# Patient Record
Sex: MALE | Race: BLACK OR AFRICAN AMERICAN | Employment: FULL TIME | URBAN - METROPOLITAN AREA
[De-identification: names, ages, dates, MRNs, and addresses within clinical notes are randomized per-mention and may not be internally consistent; named-entity substitution may affect disease eponyms.]

---
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
## 2018-04-04 ENCOUNTER — HOSPITAL ENCOUNTER (OUTPATIENT)
Dept: SLEEP CENTER | Age: 18
Discharge: HOME | End: 2018-04-04
Payer: COMMERCIAL

## 2018-04-04 PROCEDURE — 95810 POLYSOM 6/> YRS 4/> PARAM: CPT

## 2019-03-16 PROCEDURE — 84145 PROCALCITONIN (PCT): CPT

## 2019-03-17 ENCOUNTER — HOSPITAL ENCOUNTER (OUTPATIENT)
Age: 19
Discharge: HOME | End: 2019-03-19

## 2019-03-17 LAB — PROCALCITONIN: 0.1 NG/ML (ref 0–0.08)

## 2019-03-27 LAB
CREATININE: 1 MG/DL
POTASSIUM (K+): 5.1

## 2020-02-26 ENCOUNTER — OFFICE VISIT (OUTPATIENT)
Dept: ORTHOPEDIC SURGERY | Age: 20
End: 2020-02-26
Payer: COMMERCIAL

## 2020-02-26 VITALS — BODY MASS INDEX: 45.1 KG/M2 | WEIGHT: 315 LBS | HEIGHT: 70 IN

## 2020-02-26 PROCEDURE — 99203 OFFICE O/P NEW LOW 30 MIN: CPT | Performed by: ORTHOPAEDIC SURGERY

## 2020-02-26 PROCEDURE — G8484 FLU IMMUNIZE NO ADMIN: HCPCS | Performed by: ORTHOPAEDIC SURGERY

## 2020-02-26 PROCEDURE — G8427 DOCREV CUR MEDS BY ELIG CLIN: HCPCS | Performed by: ORTHOPAEDIC SURGERY

## 2020-02-26 PROCEDURE — 4004F PT TOBACCO SCREEN RCVD TLK: CPT | Performed by: ORTHOPAEDIC SURGERY

## 2020-02-26 PROCEDURE — G8417 CALC BMI ABV UP PARAM F/U: HCPCS | Performed by: ORTHOPAEDIC SURGERY

## 2020-02-26 NOTE — PROGRESS NOTES
Stefani Sherman is a 22 y.o. male, who presents   Chief Complaint   Patient presents with    New Patient     new patient for right shoulder pain, patient states he injuried his collar bone in middle school. He states his pain is throbbing. HPI[de-identified] Shoulder pain is been present for some time. The patient locates this around the clavicular shaft area. He gives a history of having a fracture of the distal right clavicle when he was age 13 years. He was treated conservatively by other orthopedic surgeons in the area. He gives no other history of other residual problems in the area. He has good range of motion and no numbness or weakness. Allergies; medications; past medical, surgical, family, and social history; and problem list have been reviewed today and updated as indicated in this encounter - see below following Ortho specifics. Musculoskeletal: This is a large framed young man who is also moderately obese. Bony and soft tissue anatomy in the shoulder girdles is grossly symmetrical.  Elbow wrist and hand motion are good and he has good strength in the upper extremities in general.  The right shoulder area has a little tenderness around the distal clavicle shaft. There is no swelling and there are no masses. There is no alteration in temperature or color in the area. Both AC joint and glenohumeral joint are stable. He has full range of motion in the right shoulder with a little discomfort with full elevation. There is little if any crepitus present. Rotator cuff elements appear to be strong and other muscles about the shoulder girdle are intact and functioning well also. Radiologic Studies: Imaging of the right shoulder was done in AP and 10 degrees caudad tilt to evaluate the majority of the clavicle as well as the [facility] joint, glenohumeral joint and subacromial space all of which look normal.  There is no residual deformity from his previous fracture.     ASSESSMENT:  Renea Witt was seen today for new patient. Diagnoses and all orders for this visit:    Chronic right shoulder pain  -     XR SHOULDER RIGHT (MIN 2 VIEWS); Future     Treatment alternatives were reviewed including medical and physical therapies, injections, and surgical options, expected risks benefits and likely outcome of each were discussed in detail, questions asked and answered and understood. I discussed all these findings with Rc Muro who indicated good understanding. PLAN: No treatment is indicated at this time and there are no restrictions of activities indicated. Patient Active Problem List   Diagnosis    Morbid obesity ([facility] [address]       History reviewed. No pertinent past medical history. History reviewed. No pertinent surgical history. Current Outpatient Medications   Medication Sig Dispense Refill    metFORMIN (GLUCOPHAGE) 500 MG tablet Take 500 mg by mouth 2 times daily (with meals)      Oxymetazoline HCl (NASAL SPRAY NA) by Nasal route       No current facility-administered medications for this visit.         No Known Allergies    Social History     Socioeconomic History    Marital status: Single     Spouse name: None    Number of children: None    Years of education: None    Highest education level: None   Occupational History    None   Social Needs    Financial resource strain: None    Food insecurity:     Worry: None     Inability: None    Transportation needs:     Medical: None     Non-medical: None   Tobacco Use    Smoking status: Current Some Day Smoker     Types: E-Cigarettes    Smokeless tobacco: Never Used    Tobacco comment: socially   Substance and Sexual Activity    Alcohol use: No     Comment: socially    Drug use: Yes     Types: Marijuana     Comment: socially    Sexual activity: Never   Lifestyle    Physical activity:     Days per week: None     Minutes per session: None    Stress: None   Relationships    Social connections:     Talks on phone: None     Gets together: None Attends Faith service: None     Active member of club or organization: None     Attends meetings of clubs or organizations: None     Relationship status: None    Intimate partner violence:     Fear of current or ex partner: None     Emotionally abused: None     Physically abused: None     Forced sexual activity: None   Other Topics Concern    None   Social History Narrative    None       Family History   Problem Relation Age of Onset    Heart Attack Father     Heart Failure Father     Heart Failure Paternal Grandfather          Review of Systems:   As follows except as previously noted in HPI:  Constitutional: Negative for chills, diaphoresis,  fever   Respiratory: Negative for cough, shortness of breath and wheezing. Cardiovascular: Negative for chest pain and palpitations. Neurological: Negative for dizziness, syncope,   GI / : abdominal pain or cramping  Musculoskeletal: see HPI       Objective:   Physical Exam   Constitutional: Oriented to person, place, and time. and appears well-developed and well-nourished. :   Head: Normocephalic and atraumatic. Neck: Neck supple. Eyes: EOM are normal.   Pulmonary/Chest: Effort normal.  No respiratory distress, no wheezes. Neurological: Alert and oriented to person  Skin: Skin is warm and dry. Faby Gongora DO    2/26/20  4:04 PM    All reasonable efforts have been made to minimize the risk of errors that may occur in the use of voice recognition and other electronic means of charting.

## 2020-05-06 VITALS
HEART RATE: 84 BPM | DIASTOLIC BLOOD PRESSURE: 84 MMHG | RESPIRATION RATE: 14 BRPM | WEIGHT: 315 LBS | SYSTOLIC BLOOD PRESSURE: 130 MMHG | BODY MASS INDEX: 47.98 KG/M2

## 2020-09-02 VITALS
HEART RATE: 76 BPM | SYSTOLIC BLOOD PRESSURE: 130 MMHG | BODY MASS INDEX: 46.63 KG/M2 | DIASTOLIC BLOOD PRESSURE: 70 MMHG | TEMPERATURE: 96.6 F | RESPIRATION RATE: 14 BRPM | WEIGHT: 315 LBS

## 2021-05-03 ENCOUNTER — OFFICE VISIT (OUTPATIENT)
Dept: FAMILY MEDICINE CLINIC | Age: 21
End: 2021-05-03
Payer: COMMERCIAL

## 2021-05-03 VITALS
OXYGEN SATURATION: 97 % | BODY MASS INDEX: 46.65 KG/M2 | SYSTOLIC BLOOD PRESSURE: 114 MMHG | WEIGHT: 315 LBS | HEART RATE: 92 BPM | DIASTOLIC BLOOD PRESSURE: 80 MMHG | HEIGHT: 69 IN

## 2021-05-03 DIAGNOSIS — J45.909 MILD ASTHMA WITHOUT COMPLICATION, UNSPECIFIED WHETHER PERSISTENT: ICD-10-CM

## 2021-05-03 DIAGNOSIS — G47.33 OBSTRUCTIVE SLEEP APNEA SYNDROME: ICD-10-CM

## 2021-05-03 DIAGNOSIS — J30.89 ALLERGIC RHINITIS DUE TO OTHER ALLERGIC TRIGGER, UNSPECIFIED SEASONALITY: ICD-10-CM

## 2021-05-03 DIAGNOSIS — E66.01 CLASS 3 SEVERE OBESITY DUE TO EXCESS CALORIES WITH SERIOUS COMORBIDITY AND BODY MASS INDEX (BMI) OF 45.0 TO 49.9 IN ADULT (HCC): ICD-10-CM

## 2021-05-03 DIAGNOSIS — E78.5 HYPERLIPIDEMIA, UNSPECIFIED HYPERLIPIDEMIA TYPE: ICD-10-CM

## 2021-05-03 DIAGNOSIS — E11.9 TYPE 2 DIABETES MELLITUS WITHOUT COMPLICATION, WITHOUT LONG-TERM CURRENT USE OF INSULIN (HCC): Primary | ICD-10-CM

## 2021-05-03 PROCEDURE — 99214 OFFICE O/P EST MOD 30 MIN: CPT | Performed by: INTERNAL MEDICINE

## 2021-05-03 PROCEDURE — 2022F DILAT RTA XM EVC RTNOPTHY: CPT | Performed by: INTERNAL MEDICINE

## 2021-05-03 PROCEDURE — 3046F HEMOGLOBIN A1C LEVEL >9.0%: CPT | Performed by: INTERNAL MEDICINE

## 2021-05-03 PROCEDURE — G8417 CALC BMI ABV UP PARAM F/U: HCPCS | Performed by: INTERNAL MEDICINE

## 2021-05-03 PROCEDURE — 4004F PT TOBACCO SCREEN RCVD TLK: CPT | Performed by: INTERNAL MEDICINE

## 2021-05-03 PROCEDURE — G8427 DOCREV CUR MEDS BY ELIG CLIN: HCPCS | Performed by: INTERNAL MEDICINE

## 2021-05-03 RX ORDER — ERTUGLIFLOZIN 5 MG/1
1 TABLET, FILM COATED ORAL
COMMUNITY
End: 2021-05-03 | Stop reason: SDUPTHER

## 2021-05-03 RX ORDER — HUMAN INSULIN 100 [IU]/ML
INJECTION, SOLUTION SUBCUTANEOUS
COMMUNITY
Start: 2021-02-06 | End: 2021-05-03

## 2021-05-03 RX ORDER — ERTUGLIFLOZIN 5 MG/1
1 TABLET, FILM COATED ORAL DAILY
Qty: 30 TABLET | Refills: 3 | Status: SHIPPED
Start: 2021-05-03 | End: 2021-07-08

## 2021-05-03 RX ORDER — INSULIN DEGLUDEC INJECTION 100 U/ML
50 INJECTION, SOLUTION SUBCUTANEOUS 2 TIMES DAILY
Qty: 45 ML | Refills: 1 | Status: SHIPPED
Start: 2021-05-03 | End: 2021-07-08 | Stop reason: SDUPTHER

## 2021-05-03 SDOH — ECONOMIC STABILITY: FOOD INSECURITY: WITHIN THE PAST 12 MONTHS, THE FOOD YOU BOUGHT JUST DIDN'T LAST AND YOU DIDN'T HAVE MONEY TO GET MORE.: NEVER TRUE

## 2021-05-03 SDOH — ECONOMIC STABILITY: INCOME INSECURITY: HOW HARD IS IT FOR YOU TO PAY FOR THE VERY BASICS LIKE FOOD, HOUSING, MEDICAL CARE, AND HEATING?: NOT ASKED

## 2021-05-03 SDOH — ECONOMIC STABILITY: TRANSPORTATION INSECURITY
IN THE PAST 12 MONTHS, HAS THE LACK OF TRANSPORTATION KEPT YOU FROM MEDICAL APPOINTMENTS OR FROM GETTING MEDICATIONS?: NO

## 2021-05-03 ASSESSMENT — PATIENT HEALTH QUESTIONNAIRE - PHQ9: SUM OF ALL RESPONSES TO PHQ QUESTIONS 1-9: 0

## 2021-05-03 NOTE — PROGRESS NOTES
Minutes per session: Not on file    Stress: Not on file   Relationships    Social connections     Talks on phone: Not on file     Gets together: Not on file     Attends Mandaeism service: Not on file     Active member of club or organization: Not on file     Attends meetings of clubs or organizations: Not on file     Relationship status: Not on file    Intimate partner violence     Fear of current or ex partner: Not on file     Emotionally abused: Not on file     Physically abused: Not on file     Forced sexual activity: Not on file   Other Topics Concern    Not on file   Social History Narrative    Not on file        No past surgical history on file. Family History   Problem Relation Age of Onset    Asthma Mother     Diabetes Mother     High Blood Pressure Mother     High Cholesterol Mother     Heart Attack Father     Heart Failure Father     Coronary Art Dis Father     Other Father         renal failure- dialysis     High Blood Pressure Father     Diabetes Father     Blindness Father     Heart Failure Paternal Grandfather         No Known Allergies     ROS  No acute distress  Cardiac: Denies any chest pain or palpitation physically active denies any angina  Respiratory: Denies any cough or shortness of breath  Denies any chronic cough no dyspnea on exertion  GI: No abdominal pain. Denies any nausea vomiting or diarrhea  : Denies any dysuria frequency or hematuria  Neuro: No headache or dizziness  Endocrine: No diabetes  Skin: normal  Morbid obesity he has lost 25 pounds by dieting and exercising he gained about 10 pounds  Denies any change in vision    Objective:    /80   Pulse 92   Ht 5' 9\" (1.753 m)   Wt (!) 335 lb (152 kg)   SpO2 97%   BMI 49.47 kg/m²     Constitutional: Alert awake and oriented  Eyes: Pupils equal bilaterally. Extraocular muscles intact  Neck: no JVD adenopathy no bruit  Heart:  RRR, no murmurs, gallops, or rubs.   Lungs:    no wheeze, rales or rhonchi  Abd: bowel sounds present, nontender, nondistended, no masses  Extrem:  No clubbing, cyanosis, or edema  Neuro: AAOx3,No Focal deficit  Psychological: no depression or anxiety   Feet exam normal    Current Outpatient Medications   Medication Sig Dispense Refill    metFORMIN (GLUCOPHAGE) 500 MG tablet Take 1 tablet by mouth 2 times daily (with meals) 180 tablet 1    Insulin Degludec (TRESIBA) 100 UNIT/ML SOLN Inject 50 Units into the skin 2 times daily 42 units BID 45 mL 1    insulin regular (NOVOLIN R) 100 UNIT/ML injection Inject 20 Units into the skin 3 times daily (before meals) 5 vial 1    Ertugliflozin L-PyroglutamicAc (STEGLATRO) 5 MG TABS Take 1 tablet by mouth daily 30 tablet 3    Oxymetazoline HCl (NASAL SPRAY NA) by Nasal route       No current facility-administered medications for this visit.          Last 3 BMP  Lab Results   Component Value Date/Time     01/18/2012 08:08 AM    K 5.1 03/27/2019    K 4.8 01/18/2012 08:08 AM     01/18/2012 08:08 AM    CO2 32 (H) 01/18/2012 08:08 AM    BUN 15 01/18/2012 08:08 AM    CREATININE 1.0 03/27/2019    CREATININE 0.8 01/18/2012 08:08 AM    GLUCOSE 94 01/18/2012 08:08 AM    CALCIUM 10.2 01/18/2012 08:08 AM       Last 3 CMP:    Lab Results   Component Value Date/Time     01/18/2012 08:08 AM    K 5.1 03/27/2019    K 4.8 01/18/2012 08:08 AM     01/18/2012 08:08 AM    CO2 32 (H) 01/18/2012 08:08 AM    BUN 15 01/18/2012 08:08 AM    CREATININE 1.0 03/27/2019    CREATININE 0.8 01/18/2012 08:08 AM    GLUCOSE 94 01/18/2012 08:08 AM    CALCIUM 10.2 01/18/2012 08:08 AM    PROT 7.5 01/18/2012 08:08 AM    LABALBU 4.4 01/18/2012 08:08 AM    BILITOT 0.3 01/18/2012 08:08 AM    ALKPHOS 218 01/18/2012 08:08 AM    AST 21 01/18/2012 08:08 AM    ALT 20 01/18/2012 08:08 AM        CBC:   Lab Results   Component Value Date/Time    WBC 5.0 01/18/2012 08:08 AM    RBC 5.06 01/18/2012 08:08 AM    HGB 13.9 01/18/2012 08:08 AM    HCT 41.3 01/18/2012 08:08 AM    MCV 81.6 units twice daily  Patient has history of diabetic ketoacidosis in the past and renal failure which has improved      Check CBC, CMP, lipid, TSH, A1c, urine albumin      Check lipid profile will need cholesterol medication if liver enzymes are okay    Asthma is controlled      Allergic rhinitis saline nose spray and Claritin over-the-counter      Sleep apnea declined to go for CPAP titration he will exercise and lose weight    Severe obesity lifestyle diet modification discussed advised to continue diet and exercise and lose weight      Patient was counseled for long time been more compliant with the medication follow-up complications of diabetes discussed in detail    Return in about 2 months (around 7/3/2021).        Germán Shoemaker MD  3:53 PM  5/3/2021     [place]

## 2021-05-04 ENCOUNTER — HOSPITAL ENCOUNTER (EMERGENCY)
Age: 21
Discharge: HOME | End: 2021-05-04
Attending: EMERGENCY MEDICINE
Payer: COMMERCIAL

## 2021-05-04 VITALS
TEMPERATURE: 97.5 F | WEIGHT: 315 LBS | OXYGEN SATURATION: 97 % | DIASTOLIC BLOOD PRESSURE: 91 MMHG | SYSTOLIC BLOOD PRESSURE: 142 MMHG | RESPIRATION RATE: 16 BRPM | HEART RATE: 80 BPM | HEIGHT: 71 IN | BODY MASS INDEX: 44.1 KG/M2

## 2021-05-04 DIAGNOSIS — S09.90XA INJURY OF HEAD, INITIAL ENCOUNTER: Primary | ICD-10-CM

## 2021-05-04 PROCEDURE — 99282 EMERGENCY DEPT VISIT SF MDM: CPT

## 2021-05-04 ASSESSMENT — ENCOUNTER SYMPTOMS
EYE DISCHARGE: 0
COUGH: 0
SINUS PRESSURE: 0
DIARRHEA: 0
WHEEZING: 0
BLURRED VISION: 0
NAUSEA: 0
DIFFICULTY BREATHING: 0
SORE THROAT: 0
DOUBLE VISION: 0
ABDOMINAL PAIN: 0
VOMITING: 0
SHORTNESS OF BREATH: 0
EYE PAIN: 0
EYE REDNESS: 0
BACK PAIN: 0

## 2021-05-04 ASSESSMENT — PAIN SCALES - GENERAL: PAINLEVEL_OUTOF10: 8

## 2021-05-04 ASSESSMENT — PAIN DESCRIPTION - PAIN TYPE: TYPE: ACUTE PAIN

## 2021-05-04 ASSESSMENT — PAIN DESCRIPTION - LOCATION: LOCATION: HEAD

## 2021-05-04 ASSESSMENT — PAIN DESCRIPTION - FREQUENCY: FREQUENCY: CONTINUOUS

## 2021-05-04 NOTE — ED PROVIDER NOTES
The history is provided by the patient. Head Injury  Location:  Frontal  Time since incident:  1 hour  Mechanism of injury: fall    Fall:     Fall occurred:  Tripped and walking    Impact surface: cabinet. Associated symptoms: headache    Associated symptoms: no blurred vision, no difficulty breathing, no disorientation, no double vision, no loss of consciousness, no nausea, no numbness, no seizures and no vomiting         Review of Systems   Constitutional: Negative for chills and fever. HENT: Negative for ear pain, sinus pressure and sore throat. Eyes: Negative for blurred vision, double vision, pain, discharge and redness. Respiratory: Negative for cough, shortness of breath and wheezing. Cardiovascular: Negative for chest pain. Gastrointestinal: Negative for abdominal pain, diarrhea, nausea and vomiting. Genitourinary: Negative for dysuria and frequency. Musculoskeletal: Negative for arthralgias and back pain. Skin: Negative for rash and wound. Neurological: Positive for headaches. Negative for seizures, loss of consciousness, weakness and numbness. Hematological: Negative for adenopathy. All other systems reviewed and are negative. Physical Exam  Vitals signs and nursing note reviewed. Constitutional:       Appearance: He is well-developed. HENT:      Head: Normocephalic and atraumatic. Jaw: No trismus. Right Ear: Hearing, tympanic membrane and external ear normal.      Left Ear: Hearing, tympanic membrane and external ear normal.      Nose: Nose normal.      Right Sinus: No maxillary sinus tenderness or frontal sinus tenderness. Left Sinus: No maxillary sinus tenderness or frontal sinus tenderness. Mouth/Throat:      Pharynx: Uvula midline. No uvula swelling. Eyes:      General: Lids are normal.      Conjunctiva/sclera: Conjunctivae normal.      Pupils: Pupils are equal, round, and reactive to light.    Neck:      Musculoskeletal: Normal range of motion and neck supple. Cardiovascular:      Rate and Rhythm: Normal rate and regular rhythm. Heart sounds: Normal heart sounds. No murmur. Pulmonary:      Effort: Pulmonary effort is normal. No respiratory distress. Breath sounds: Normal breath sounds. No wheezing or rales. Abdominal:      General: Bowel sounds are normal.      Palpations: Abdomen is soft. Abdomen is not rigid. Tenderness: There is no abdominal tenderness. There is no guarding or rebound. Skin:     General: Skin is warm and dry. Findings: No abrasion or rash. Neurological:      Mental Status: He is alert and oriented to person, place, and time. GCS: GCS eye subscore is 4. GCS verbal subscore is 5. GCS motor subscore is 6. Cranial Nerves: No cranial nerve deficit. Sensory: No sensory deficit. Coordination: Coordination normal.      Gait: Gait normal.          Procedures     MDM          --------------------------------------------- PAST HISTORY ---------------------------------------------  Past Medical History:  has a past medical history of Asthma, [facility] Hyperlipidemia, Hypertension, Obesity, Sinusitis, and Type 2 diabetes mellitus without complication ([facility] Past Surgical History:  has no past surgical history on file. Social History:  reports that he has been smoking e-cigarettes. He has never used smokeless tobacco. He reports current alcohol use. He reports current drug use. Drug: Marijuana. Family History: family history includes Asthma in his mother; Blindness in his father; Coronary Art Dis in his father; Diabetes in his father and mother; Heart Attack in his father; Heart Failure in his father and paternal grandfather; High Blood Pressure in his father and mother; High Cholesterol in his mother; Other in his father. The patients home medications have been reviewed.     Allergies: Patient has no known allergies. -------------------------------------------------- RESULTS -------------------------------------------------  Labs:  No results found for this visit on 05/04/21. Radiology:  No orders to display       ------------------------- NURSING NOTES AND VITALS REVIEWED ---------------------------  Date / Time Roomed:  5/4/2021  6:25 PM  ED Bed Assignment:  02/02    The nursing notes within the ED encounter and vital signs as below have been reviewed. BP (!) 142/91   Pulse 80   Temp 97.5 °F (36.4 °C) (Temporal)   Resp 16   Ht 5' 11\" (1.803 m)   Wt (!) 335 lb (152 kg)   SpO2 97%   BMI 46.72 kg/m²   Oxygen Saturation Interpretation: Normal      ------------------------------------------ PROGRESS NOTES ------------------------------------------  I have spoken with the patient and discussed todays results, in addition to providing specific details for the plan of care and counseling regarding the diagnosis and prognosis. Their questions are answered at this time and they are agreeable with the plan. I discussed at length with them reasons for immediate return here for re evaluation. They will followup with primary care by calling their office tomorrow. --------------------------------- ADDITIONAL PROVIDER NOTES ---------------------------------  At this time the patient is without objective evidence of an acute process requiring hospitalization or inpatient management. They have remained hemodynamically stable throughout their entire ED visit and are stable for discharge with outpatient follow-up. The plan has been discussed in detail and they are aware of the specific conditions for emergent return, as well as the importance of follow-up. Discharge Medication List as of 5/4/2021  6:44 PM          Diagnosis:  1. Injury of head, initial encounter        Disposition:  Patient's disposition: Discharge to home  Patient's condition is stable.                       Mo Zimmerman, MD  05/04/21 [id number]

## 2021-07-08 ENCOUNTER — OFFICE VISIT (OUTPATIENT)
Dept: FAMILY MEDICINE CLINIC | Age: 21
End: 2021-07-08
Payer: COMMERCIAL

## 2021-07-08 VITALS
BODY MASS INDEX: 44.77 KG/M2 | TEMPERATURE: 97.6 F | HEART RATE: 59 BPM | OXYGEN SATURATION: 98 % | DIASTOLIC BLOOD PRESSURE: 84 MMHG | SYSTOLIC BLOOD PRESSURE: 128 MMHG | WEIGHT: 315 LBS

## 2021-07-08 DIAGNOSIS — E66.01 CLASS 3 SEVERE OBESITY DUE TO EXCESS CALORIES WITHOUT SERIOUS COMORBIDITY WITH BODY MASS INDEX (BMI) OF 40.0 TO 44.9 IN ADULT (HCC): ICD-10-CM

## 2021-07-08 DIAGNOSIS — J01.90 ACUTE SINUSITIS, RECURRENCE NOT SPECIFIED, UNSPECIFIED LOCATION: Primary | ICD-10-CM

## 2021-07-08 DIAGNOSIS — J30.89 ALLERGIC RHINITIS DUE TO OTHER ALLERGIC TRIGGER, UNSPECIFIED SEASONALITY: ICD-10-CM

## 2021-07-08 DIAGNOSIS — E78.5 HYPERLIPIDEMIA, UNSPECIFIED HYPERLIPIDEMIA TYPE: ICD-10-CM

## 2021-07-08 DIAGNOSIS — J45.990 EXERCISE-INDUCED ASTHMA: ICD-10-CM

## 2021-07-08 DIAGNOSIS — G47.33 OBSTRUCTIVE SLEEP APNEA SYNDROME: ICD-10-CM

## 2021-07-08 DIAGNOSIS — E11.9 TYPE 2 DIABETES MELLITUS WITHOUT COMPLICATION, WITHOUT LONG-TERM CURRENT USE OF INSULIN (HCC): ICD-10-CM

## 2021-07-08 PROBLEM — J30.9 ALLERGIC RHINITIS: Status: ACTIVE | Noted: 2021-07-08

## 2021-07-08 PROBLEM — E66.813 CLASS 3 SEVERE OBESITY DUE TO EXCESS CALORIES WITHOUT SERIOUS COMORBIDITY WITH BODY MASS INDEX (BMI) OF 40.0 TO 44.9 IN ADULT: Status: ACTIVE | Noted: 2021-07-08

## 2021-07-08 PROCEDURE — G8417 CALC BMI ABV UP PARAM F/U: HCPCS | Performed by: INTERNAL MEDICINE

## 2021-07-08 PROCEDURE — 3046F HEMOGLOBIN A1C LEVEL >9.0%: CPT | Performed by: INTERNAL MEDICINE

## 2021-07-08 PROCEDURE — 2022F DILAT RTA XM EVC RTNOPTHY: CPT | Performed by: INTERNAL MEDICINE

## 2021-07-08 PROCEDURE — G8427 DOCREV CUR MEDS BY ELIG CLIN: HCPCS | Performed by: INTERNAL MEDICINE

## 2021-07-08 PROCEDURE — 99213 OFFICE O/P EST LOW 20 MIN: CPT | Performed by: INTERNAL MEDICINE

## 2021-07-08 PROCEDURE — 4004F PT TOBACCO SCREEN RCVD TLK: CPT | Performed by: INTERNAL MEDICINE

## 2021-07-08 RX ORDER — AZITHROMYCIN 250 MG/1
250 TABLET, FILM COATED ORAL SEE ADMIN INSTRUCTIONS
Qty: 6 TABLET | Refills: 0 | Status: SHIPPED | OUTPATIENT
Start: 2021-07-08 | End: 2021-07-13

## 2021-07-08 RX ORDER — DEXTROMETHORPHAN HYDROBROMIDE AND PROMETHAZINE HYDROCHLORIDE 15; 6.25 MG/5ML; MG/5ML
5 SYRUP ORAL 4 TIMES DAILY PRN
Qty: 150 ML | Refills: 0 | Status: SHIPPED | OUTPATIENT
Start: 2021-07-08 | End: 2021-07-18

## 2021-07-08 RX ORDER — INSULIN DEGLUDEC INJECTION 100 U/ML
50 INJECTION, SOLUTION SUBCUTANEOUS 2 TIMES DAILY
Qty: 45 ML | Refills: 1 | Status: SHIPPED
Start: 2021-07-08 | End: 2022-01-04 | Stop reason: SDUPTHER

## 2021-07-08 NOTE — PROGRESS NOTES
Subjective:     Chief Complaint   Patient presents with    3 Month Follow-Up    Eczema     hands    Chest Congestion   Patient complains of runny nose stuffy nose drainage  Has history of asthma  Occasional cough    No nausea vomiting diarrhea    History of exercise-induced asthma  Doing better now    Follow-up on the diabetes    He did not do the blood work he lost his prescription    He has not been able to contact the endocrinologist    Ovalleens Quintana is not covered by the insurance company        Past Medical History:   Diagnosis Date    Asthma     DKA (diabetic k[facility]acidoses) ([facility] 2019    Hyperlipidemia     Hypertension     Obesity     Sinusitis     Type 2 diabetes mellitus without complication ([facility]         Social History     Socioeconomic History    Marital status: Single     Spouse name: Not on file    Number of children: Not on file    Years of education: Not on file    Highest education level: Not on file   Occupational History    Not on file   Tobacco Use    Smoking status: Current Some Day Smoker     Types: E-Cigarettes    Smokeless tobacco: Never Used    Tobacco comment: socially   Vaping Use    Vaping Use: Never used   Substance and Sexual Activity    Alcohol use: Yes     Comment: socially    Drug use: Yes     Types: Marijuana     Comment: socially    Sexual activity: Never   Other Topics Concern    Not on file   Social History Narrative    Not on file     Social Determinants of Health     Financial Resource Strain:     Difficulty of Paying Living Expenses:    Food Insecurity: No Food Insecurity    Worried About Running Out of Food in the Last Year: Never true    Nabor of Food in the Last Year: Never true   Transportation Needs: No Transportation Needs    Lack of Transportation (Medical): No    Lack of Transportation (Non-Medical):  No   Physical Activity:     Days of Exercise per Week:     Minutes of Exercise per Session:    Stress:     Feeling of Stress :    Social deficit  Psychological: no depression or anxiety       Current Outpatient Medications   Medication Sig Dispense Refill    metFORMIN (GLUCOPHAGE) 500 MG tablet Take 1 tablet by mouth 2 times daily (with meals) 180 tablet 1    insulin regular (NOVOLIN R) 100 UNIT/ML injection Inject 20 Units into the skin 3 times daily (before meals) 5 vial 1    Insulin Degludec (TRESIBA) 100 UNIT/ML SOLN Inject 50 Units into the skin 2 times daily 42 units BID 45 mL 1    Semaglutide (OZEMPIC, 0.25 OR 0.5 MG/DOSE, SC) Inject 0.5 mg into the skin      dapagliflozin (FARXIGA) 5 MG tablet Take 1 tablet by mouth every morning 30 tablet 3    Naloxegol Oxalate (MOVANTIK PO) Take by mouth once a week       No current facility-administered medications for this visit.         Last 3 BMP  Lab Results   Component Value Date/Time     01/18/2012 08:08 AM    K 5.1 03/27/2019 12:00 AM    K 4.8 01/18/2012 08:08 AM     01/18/2012 08:08 AM    CO2 32 (H) 01/18/2012 08:08 AM    BUN 15 01/18/2012 08:08 AM    CREATININE 1.0 03/27/2019 12:00 AM    CREATININE 0.8 01/18/2012 08:08 AM    GLUCOSE 94 01/18/2012 08:08 AM    CALCIUM 10.2 01/18/2012 08:08 AM       Last 3 CMP:    Lab Results   Component Value Date/Time     01/18/2012 08:08 AM    K 5.1 03/27/2019 12:00 AM    K 4.8 01/18/2012 08:08 AM     01/18/2012 08:08 AM    CO2 32 (H) 01/18/2012 08:08 AM    BUN 15 01/18/2012 08:08 AM    CREATININE 1.0 03/27/2019 12:00 AM    CREATININE 0.8 01/18/2012 08:08 AM    GLUCOSE 94 01/18/2012 08:08 AM    CALCIUM 10.2 01/18/2012 08:08 AM    PROT 7.5 01/18/2012 08:08 AM    LABALBU 4.4 01/18/2012 08:08 AM    BILITOT 0.3 01/18/2012 08:08 AM    ALKPHOS 218 01/18/2012 08:08 AM    AST 21 01/18/2012 08:08 AM    ALT 20 01/18/2012 08:08 AM        CBC:   Lab Results   Component Value Date/Time    WBC 5.0 01/18/2012 08:08 AM    RBC 5.06 01/18/2012 08:08 AM    HGB 13.9 01/18/2012 08:08 AM    HCT 41.3 01/18/2012 08:08 AM    MCV 81.6 01/18/2012 08:08 AM    MCH 27.4 01/18/2012 08:08 AM    MCHC 33.6 01/18/2012 08:08 AM    RDW 15.0 01/18/2012 08:08 AM     01/18/2012 08:08 AM    MPV 9.1 01/18/2012 08:08 AM       A1C:  No results found for: LABA1C    Lipid panel:  No results found for: CHOL, TRIG, HDL     Lab Results   Component Value Date/Time    PROT 7.5 01/18/2012 08:08 AM       No results found for: MG      Assessment. Oli Moreno was seen today for 3 month follow-up, eczema and chest congestion. Diagnoses and all orders for this visit:    Acute sinusitis, recurrence not specified, unspecified location    Type 2 diabetes mellitus without complication, without long-term current use of insulin ([facility])  -     TSH; Future  -     URINALYSIS; Future  -     MICROALBUMIN, UR; Future  -     Kristina Baez MD, Endocrinology, [place]    Hyperlipidemia, unspecified hyperlipidemia type  -     LIPID PANEL; Future  -     TSH; Future    Class 3 severe obesity due to excess calories without serious comorbidity with body mass index (BMI) of 40.0 to 44.9 in adult ([facility])  -     CBC WITH AUTO DIFFERENTIAL; Future  -     COMPREHENSIVE METABOLIC PANEL; Future  -     HEMOGLOBIN A1C; Future    Obstructive sleep apnea syndrome    Allergic rhinitis due to other allergic trigger, unspecified seasonality    Exercise-induced asthma    Other orders  -     metFORMIN (GLUCOPHAGE) 500 MG tablet; Take 1 tablet by mouth 2 times daily (with meals)  -     insulin regular (NOVOLIN R) 100 UNIT/ML injection; Inject 20 Units into the skin 3 times daily (before meals)  -     Insulin Degludec (TRESIBA) 100 UNIT/ML SOLN; Inject 50 Units into the skin 2 times daily 42 units BID  -     dapagliflozin (FARXIGA) 5 MG tablet;  Take 1 tablet by mouth every morning       Patient Active Problem List   Diagnosis    Morbid obesity ([address]    Class 3 severe obesity due to excess calories without serious comorbidity with body mass index (BMI) of 40.0 to 44.9 in adult [facility])    Hyperlipidemia    Type 2 diabetes mellitus without complication, without long-term current use of insulin ([facility]    Obstructive sleep apnea syndrome    Allergic rhinitis       Plan: Sinusitis Z-Rohan and Phenergan DM syrup    Diabetes type 2 he is not checking his sugar did not do the A1c another prescription given  Steglatro not covered  Try Farxiga  Stay on Ozempic, Metformin, Tresiba, regular insulin,  Continue watch the diet    Check lipid profile    Sleep apnea improving since he lost weight    He is still working on the diet and exercise and losing weight has more energy and feeling better    Referred to [address] [place] [address] endocrinology    Return in about 3 months (around 10/8/2021).        Kylie Mishra MD  2:55 PM  7/8/2021     [place]

## 2021-11-14 ENCOUNTER — HOSPITAL ENCOUNTER (EMERGENCY)
Age: 21
Discharge: HOME | End: 2021-11-14
Attending: EMERGENCY MEDICINE
Payer: COMMERCIAL

## 2021-11-14 VITALS
DIASTOLIC BLOOD PRESSURE: 84 MMHG | HEART RATE: 68 BPM | RESPIRATION RATE: 18 BRPM | BODY MASS INDEX: 43.96 KG/M2 | OXYGEN SATURATION: 99 % | WEIGHT: 315 LBS | SYSTOLIC BLOOD PRESSURE: 129 MMHG | TEMPERATURE: 97.1 F

## 2021-11-14 DIAGNOSIS — R11.2 NON-INTRACTABLE VOMITING WITH NAUSEA, UNSPECIFIED VOMITING TYPE: Primary | ICD-10-CM

## 2021-11-14 LAB — SARS-COV-2, NAAT: NOT DETECTED

## 2021-11-14 PROCEDURE — 87635 SARS-COV-2 COVID-19 AMP PRB: CPT

## 2021-11-14 PROCEDURE — 99283 EMERGENCY DEPT VISIT LOW MDM: CPT

## 2021-11-14 PROCEDURE — 6370000000 HC RX 637 (ALT 250 FOR IP): Performed by: EMERGENCY MEDICINE

## 2021-11-14 RX ORDER — ONDANSETRON 4 MG/1
4 TABLET, ORALLY DISINTEGRATING ORAL EVERY 8 HOURS PRN
Qty: 6 TABLET | Refills: 0 | Status: SHIPPED | OUTPATIENT
Start: 2021-11-14 | End: 2022-01-04

## 2021-11-14 RX ORDER — ONDANSETRON 4 MG/1
4 TABLET, ORALLY DISINTEGRATING ORAL ONCE
Status: COMPLETED | OUTPATIENT
Start: 2021-11-14 | End: 2021-11-14

## 2021-11-14 RX ADMIN — ONDANSETRON 4 MG: 4 TABLET, ORALLY DISINTEGRATING ORAL at 14:14

## 2021-11-14 ASSESSMENT — ENCOUNTER SYMPTOMS
NAUSEA: 1
EYE REDNESS: 0
VOMITING: 1
ABDOMINAL PAIN: 1
COUGH: 0
SORE THROAT: 0
EYE DISCHARGE: 0
EYE PAIN: 0
WHEEZING: 0
DIARRHEA: 0
SINUS PRESSURE: 0
BACK PAIN: 0
SHORTNESS OF BREATH: 0

## 2021-11-14 ASSESSMENT — PAIN DESCRIPTION - LOCATION: LOCATION: ABDOMEN

## 2021-11-14 ASSESSMENT — PAIN SCALES - GENERAL: PAINLEVEL_OUTOF10: 4

## 2021-11-14 ASSESSMENT — PAIN DESCRIPTION - DESCRIPTORS: DESCRIPTORS: CRAMPING

## 2021-11-14 ASSESSMENT — PAIN DESCRIPTION - ORIENTATION: ORIENTATION: LOWER

## 2021-11-14 ASSESSMENT — PAIN DESCRIPTION - FREQUENCY: FREQUENCY: INTERMITTENT

## 2021-11-14 NOTE — Clinical Note
Rose Salazar was seen and treated in our emergency department on 11/14/2021. He may return to work on 11/16/2021. If you have any questions or concerns, please don't hesitate to call.       Catrachito Ruiz, DO

## 2021-11-14 NOTE — ED PROVIDER NOTES
The history is provided by the patient. Nausea & Vomiting  Severity:  Mild  Timing:  Intermittent  Number of daily episodes:  2  Quality:  Stomach contents  Progression:  Partially resolved  Chronicity:  New  Recent urination:  Normal  Relieved by:  Nothing  Worsened by:  Nothing  Ineffective treatments:  None tried  Associated symptoms: abdominal pain    Associated symptoms: no arthralgias, no chills, no cough, no diarrhea, no fever, no headaches and no sore throat         Review of Systems   Constitutional: Negative for chills and fever. HENT: Negative for ear pain, sinus pressure and sore throat. Eyes: Negative for pain, discharge and redness. Respiratory: Negative for cough, shortness of breath and wheezing. Cardiovascular: Negative for chest pain. Gastrointestinal: Positive for abdominal pain, nausea and vomiting. Negative for diarrhea. Genitourinary: Negative for dysuria and frequency. Musculoskeletal: Negative for arthralgias and back pain. Skin: Negative for rash and wound. Neurological: Negative for weakness and headaches. Hematological: Negative for adenopathy. Psychiatric/Behavioral: Negative. All other systems reviewed and are negative. Physical Exam  Vitals and nursing note reviewed. Constitutional:       Appearance: He is well-developed. HENT:      Head: Normocephalic and atraumatic. Eyes:      Pupils: Pupils are equal, round, and reactive to light. Cardiovascular:      Rate and Rhythm: Normal rate and regular rhythm. Heart sounds: Normal heart sounds. No murmur heard. Pulmonary:      Effort: Pulmonary effort is normal.      Breath sounds: Normal breath sounds. Abdominal:      General: Bowel sounds are increased. Palpations: Abdomen is soft. Tenderness: There is abdominal tenderness in the epigastric area. There is no guarding or rebound. Musculoskeletal:      Cervical back: Normal range of motion and neck supple.    Skin: General: Skin is warm and dry. Neurological:      Mental Status: He is alert and oriented to person, place, and time. Psychiatric:         Behavior: Behavior normal.         Thought Content: Thought content normal.         Judgment: Judgment normal.        --------------------------------------------- PAST HISTORY ---------------------------------------------  Past Medical History:  has a past medical history of Asthma, DKA (diabetic ketoacidoses), Hyperlipidemia, Hypertension, Obesity, Sinusitis, and Type 2 diabetes mellitus without complication ([facility] Past Surgical History:  has no past surgical history on file. Social History:  reports that he has been smoking e-cigarettes. He has never used smokeless tobacco. He reports current alcohol use. He reports current drug use. Drug: Marijuana Sanchez Gao). Family History: family history includes Asthma in his mother; Blindness in his father; Coronary Art Dis in his father; Diabetes in his father and mother; Heart Attack in his father; Heart Failure in his father and paternal grandfather; High Blood Pressure in his father and mother; High Cholesterol in his mother; Other in his father. The patients home medications have been reviewed. Allergies: Patient has no known allergies. -------------------------------------------------- RESULTS -------------------------------------------------  Results for orders placed or performed during the hospital encounter of 11/14/21   COVID-19, Rapid    Specimen: Nares   Result Value Ref Range    SARS-CoV-2, NAAT Not Detected Not Detected     No orders to display       ------------------------- NURSING NOTES AND VITALS REVIEWED ---------------------------   The nursing notes within the ED encounter and vital signs as below have been reviewed.    /84   Pulse 68   Temp 97.1 °F (36.2 °C)   Resp 18   Wt (!) 315 lb 3.2 oz (143 kg)   SpO2 99%   BMI 43.96 kg/m²   Oxygen Saturation Interpretation: Normal      ------------------------------------------ PROGRESS NOTES ------------------------------------------   I have spoken with the patient and discussed todays results, in addition to providing specific details for the plan of care and counseling regarding the diagnosis and prognosis. Their questions are answered at this time and they are agreeable with the plan.      --------------------------------- ADDITIONAL PROVIDER NOTES ---------------------------------        This patient is stable for discharge. I have shared the specific conditions for return, as well as the importance of follow-up.       IMPRESSION:     1. Non-intractable vomiting with nausea, unspecified vomiting type      Patient's Medications   New Prescriptions    ONDANSETRON (ZOFRAN ODT) 4 MG DISINTEGRATING TABLET    Take 1 tablet by mouth every 8 hours as needed for Nausea or Vomiting   Previous Medications    DAPAGLIFLOZIN (FARXIGA) 5 MG TABLET    Take 1 tablet by mouth every morning    INSULIN DEGLUDEC (TRESIBA) 100 UNIT/ML SOLN    Inject 50 Units into the skin 2 times daily 42 units BID    INSULIN REGULAR (NOVOLIN R) 100 UNIT/ML INJECTION    Inject 20 Units into the skin 3 times daily (before meals)    METFORMIN (GLUCOPHAGE) 500 MG TABLET    Take 1 tablet by mouth 2 times daily (with meals)    NALOXEGOL OXALATE (MOVANTIK PO)    Take by mouth once a week   Modified Medications    No medications on file   Discontinued Medications    SEMAGLUTIDE (OZEMPIC, 0.25 OR 0.5 MG/DOSE, SC)    Inject 0.5 mg into the skin         Zoran Carlisle DO  11/14/21 0280

## 2022-01-04 ENCOUNTER — OFFICE VISIT (OUTPATIENT)
Dept: FAMILY MEDICINE CLINIC | Age: 22
End: 2022-01-04
Payer: COMMERCIAL

## 2022-01-04 VITALS
WEIGHT: 294 LBS | OXYGEN SATURATION: 96 % | DIASTOLIC BLOOD PRESSURE: 86 MMHG | TEMPERATURE: 97.3 F | BODY MASS INDEX: 41 KG/M2 | SYSTOLIC BLOOD PRESSURE: 130 MMHG | HEART RATE: 80 BPM

## 2022-01-04 DIAGNOSIS — E66.01 MORBID OBESITY (HCC): ICD-10-CM

## 2022-01-04 DIAGNOSIS — R63.4 WEIGHT LOSS: ICD-10-CM

## 2022-01-04 DIAGNOSIS — E11.9 TYPE 2 DIABETES MELLITUS WITHOUT COMPLICATION, WITHOUT LONG-TERM CURRENT USE OF INSULIN (HCC): Primary | ICD-10-CM

## 2022-01-04 DIAGNOSIS — E78.5 HYPERLIPIDEMIA, UNSPECIFIED HYPERLIPIDEMIA TYPE: ICD-10-CM

## 2022-01-04 DIAGNOSIS — Z23 NEED FOR IMMUNIZATION AGAINST INFLUENZA: ICD-10-CM

## 2022-01-04 DIAGNOSIS — J45.990 EXERCISE-INDUCED ASTHMA: ICD-10-CM

## 2022-01-04 PROCEDURE — 99214 OFFICE O/P EST MOD 30 MIN: CPT | Performed by: INTERNAL MEDICINE

## 2022-01-04 PROCEDURE — 90674 CCIIV4 VAC NO PRSV 0.5 ML IM: CPT | Performed by: INTERNAL MEDICINE

## 2022-01-04 PROCEDURE — 2022F DILAT RTA XM EVC RTNOPTHY: CPT | Performed by: INTERNAL MEDICINE

## 2022-01-04 PROCEDURE — 90471 IMMUNIZATION ADMIN: CPT | Performed by: INTERNAL MEDICINE

## 2022-01-04 PROCEDURE — 4004F PT TOBACCO SCREEN RCVD TLK: CPT | Performed by: INTERNAL MEDICINE

## 2022-01-04 PROCEDURE — G8484 FLU IMMUNIZE NO ADMIN: HCPCS | Performed by: INTERNAL MEDICINE

## 2022-01-04 PROCEDURE — 3046F HEMOGLOBIN A1C LEVEL >9.0%: CPT | Performed by: INTERNAL MEDICINE

## 2022-01-04 PROCEDURE — G8417 CALC BMI ABV UP PARAM F/U: HCPCS | Performed by: INTERNAL MEDICINE

## 2022-01-04 PROCEDURE — G8427 DOCREV CUR MEDS BY ELIG CLIN: HCPCS | Performed by: INTERNAL MEDICINE

## 2022-01-04 RX ORDER — INSULIN DEGLUDEC INJECTION 100 U/ML
50 INJECTION, SOLUTION SUBCUTANEOUS 2 TIMES DAILY
Qty: 45 ML | Refills: 1 | Status: SHIPPED | OUTPATIENT
Start: 2022-01-04 | End: 2022-03-15 | Stop reason: SDUPTHER

## 2022-01-04 NOTE — PROGRESS NOTES
Subjective:     Chief Complaint   Patient presents with    Diabetes   Patient here for follow-up on diabetes,  She has not done the blood work, he was given prescription twice in the past he has not done the blood work,    He is trying to watch diet and lose weight,  He used to weigh 325 pounds now he weighs 294 pounds    He is not drinking any pop, not eating any sweets, does not drink any juice,  He walks a lot, he was seen by endocrinologist in the past, he wanted to see a different physician, he has been referred to endocrinology he has an appointment on 14 January 2022 advised to keep that appointment    He was given [place] last time he did not use that he did not get that  He never called me back    He has history of asthma doing very well    He is not snoring as much since he lost weight    He used to smoke in the past 1 pack a day he smoked for 10 years quit smoking now    Past Medical History:   Diagnosis Date    Asthma     DKA (diabetic ketoacidoses) 2019    Hyperlipidemia     Hypertension     Obesity     Sinusitis     Type 2 [facility] mellitus without complication [facility]         Social History     Socioeconomic History    Marital status: Single     Spouse name: Not on file    Number of children: Not on file    Years of education: Not on file    Highest education level: Not on file   Occupational History    Not on file   Tobacco Use    Smoking status: Current Some Day Smoker     Types: E-Cigarettes    Smokeless tobacco: Never Used    Tobacco comment: socially   Vaping Use    Vaping Use: Never used   Substance and Sexual Activity    Alcohol use: Yes     Comment: socially    Drug use: Yes     Types: Marijuana Janel Mejia     Comment: socially    Sexual activity: Never   Other Topics Concern    Not on file   Social History Narrative    Not on file     Social Determinants of Health     Financial Resource Strain:     Difficulty of Paying Living Expenses: Not on file   Food Insecurity: No Food Insecurity    Worried About Running Out of Food in the Last Year: Never true    N[place] of Food in the Last Year: Never true   Transportation Needs: No Transportation Needs    Lack of Transportation (Medical): No    Lack of Transportation (Non-Medical): No   Physical Activity:     Days of Exercise per Week: Not on file    Minutes of Exercise per Session: Not on file   Stress:     Feeling of Stress : Not on file   Social Connections:     Frequency of Communication with Friends and Family: Not on file    Frequency of Social Gatherings with Friends and Family: Not on file    Attends Orthodox Services: Not on file    Active Member of [address] [place] [address] [employer] or Organizations: Not on file    Attends Club or Organization Meetings: Not on file    Marital Status: Not on file   Intimate Partner Violence:     Fear of Current or Ex-Partner: Not on file    Emotionally Abused: Not on file    Physically Abused: Not on file    Sexually Abused: Not on file   Housing Stability:     Unable to Pay for Housing in the Last Year: Not on file    Number of [place] in the Last Year: Not on file    Unstable Housing in the Last Year: Not on file        History reviewed. No pertinent surgical history. Family History   Problem Relation Age of Onset    Asthma Mother     Diabetes Mother     High Blood Pressure Mother     High Cholesterol Mother     Heart Attack Father     Heart Failure Father     Coronary Art Dis Father     Other Father         renal failure- dialysis     High Blood Pressure Father     Diabetes Father     Blindness Father     Heart Failure Paternal Grandfather         No Known Allergies     ROS  No acute distress  Cardiac: Denies any chest pain or palpitation  Physically active, exercises for 1 hour at the gym, trying to lose weight,  Respiratory: Denies any cough or shortness of breath  GI: No abdominal pain. Denies any nausea vomiting or diarrhea  : Denies any dysuria frequency or hematuria  Neuro:  No headache or dizziness  Endocrine: No diabetes  Skin: normal  Morbid obesity trying to lose weight  Does not drink any pop, juice, no sweets,  Exercising regularly  Denies any change in vision    Objective:    /86   Pulse 80   Temp 97.3 °F (36.3 °C)   Wt 294 lb (133.4 kg)   SpO2 96%   BMI 41.00 kg/m²     He used to weigh 325 pounds 2020 now he weighs 294 pounds, dieting exercising watching diet    Has a good appetite, no chronic cough,    Constitutional: Alert awake and oriented  Eyes: Pupils equal bilaterally. Extraocular muscles intact  Neck: no JVD adenopathy no bruit  Heart:  RRR, no murmurs, gallops, or rubs. Lungs:    no wheeze, rales or rhonchi  Abd: bowel sounds present, nontender, nondistended, no masses  Extrem:  No clubbing, cyanosis, or edema  Neuro: AAOx3,No Focal deficit  Psychological: no depression or anxiety       Current Outpatient Medications   Medication Sig Dispense Refill    metFORMIN (GLUCOPHAGE) 500 MG tablet Take 1 tablet by mouth 2 times daily (with meals) 180 tablet 1    insulin regular (NOVOLIN R) 100 UNIT/ML injection Inject 20 Units into the skin 3 times daily (before meals) 5 each 1    Insulin Degludec (TRESIBA) 100 UNIT/ML SOLN Inject 50 Units into the skin 2 times daily 42 units BID 45 mL 1    dapagliflozin (FARXIGA) 5 MG tablet Take 1 tablet by mouth every morning 30 tablet 3     No current facility-administered medications for this visit.         Last 3 BMP  Lab Results   Component Value Date/Time     01/18/2012 08:08 AM    K 5.1 03/27/2019 12:00 AM    K 4.8 01/18/2012 08:08 AM     01/18/2012 08:08 AM    CO2 32 (H) 01/18/2012 08:08 AM    BUN 15 01/18/2012 08:08 AM    CREATININE 1.0 03/27/2019 12:00 AM    CREATININE 0.8 01/18/2012 08:08 AM    GLUCOSE 94 01/18/2012 08:08 AM    CALCIUM 10.2 01/18/2012 08:08 AM       Last 3 CMP:    Lab Results   Component Value Date/Time     01/18/2012 08:08 AM    K 5.1 03/27/2019 12:00 AM    K 4.8 01/18/2012 08:08 AM  01/18/2012 08:08 AM    CO2 32 (H) 01/18/2012 08:08 AM    BUN 15 01/18/2012 08:08 AM    CREATININE 1.0 03/27/2019 12:00 AM    CREATININE 0.8 01/18/2012 08:08 AM    GLUCOSE 94 01/18/2012 08:08 AM    CALCIUM 10.2 01/18/2012 08:08 AM    PROT 7.5 01/18/2012 08:08 AM    LABALBU 4.4 01/18/2012 08:08 AM    BILITOT 0.3 01/18/2012 08:08 AM    ALKPHOS 218 01/18/2012 08:08 AM    AST 21 01/18/2012 08:08 AM    ALT 20 01/18/2012 08:08 AM        CBC:   Lab Results   Component Value Date/Time    WBC 5.0 01/18/2012 08:08 AM    RBC 5.06 01/18/2012 08:08 AM    HGB 13.9 01/18/2012 08:08 AM    HCT 41.3 01/18/2012 08:08 AM    MCV 81.6 01/18/2012 08:08 AM    MCH 27.4 01/18/2012 08:08 AM    MCHC 33.6 01/18/2012 08:08 AM    RDW 15.0 01/18/2012 08:08 AM     01/18/2012 08:08 AM    MPV 9.1 01/18/2012 08:08 AM       A1C:  No results found for: LABA1C    Lipid panel:  No results found for: CHOL, TRIG, HDL, LDL     Lab Results   Component Value Date/Time    PROT 7.5 01/18/2012 08:08 AM       No results found for: MG      Warner Gar was seen today for diabetes. Diagnoses and all orders for this visit:    Type 2 diabetes mellitus without complication, without long-term current use of insulin ([address]  -     US LIVER; Future  -     US PANCREAS; Future    Weight loss  -     US LIVER; Future  -     US PANCREAS; Future  -     XR CHEST (2 VW); Future    Exercise-induced asthma    Morbid obesity ([address]    Hyperlipidemia, unspecified hyperlipidemia type    Other orders  -     metFORMIN (GLUCOPHAGE) 500 MG tablet; Take 1 tablet by mouth 2 times daily (with meals)  -     insulin regular (NOVOLIN R) 100 UNIT/ML injection; Inject 20 Units into the skin 3 times daily (before meals)  -     Insulin Degludec (TRESIBA) 100 UNIT/ML SOLN; Inject 50 Units into the skin 2 times daily 42 units BID  -     dapagliflozin (FARXIGA) 5 MG tablet;  Take 1 tablet by mouth every morning       Patient Active Problem List   Diagnosis    Morbid obesity ([facility]  Class 3 severe obesity due to excess calories without serious comorbidity with body mass index (BMI) of 40.0 to 44.9 in adult [facility])    Hyperlipidemia    Type 2 diabetes mellitus without complication, without long-term current use of insulin (HCC)    Obstructive sleep apnea syndrome    Allergic rhinitis    Exercise-induced asthma       Plan: Diabetes type 2 patient on Tresiba 50 units twice a day, Novolin R 20 units 3 3 times daily, Metformin 500 twice daily he did not fill the prescription for Adonica Cordial he is trying to watch diet and losing weight    Check A1c he promised to do it tomorrow    Obesity trying to lose weight doing a good job  Ultrasound right upper quadrant to assess the liver and the pancreas    History of asthma and smoker in the past has occasional cough to a chest x-ray today      Hyperlipidemia check CMP and lipid profile he needs cholesterol medication    Patient has an appointment to see the endocrinologist Dr. Chang Pena January 14, 2022 I advised him to keep that appointment    Advised him to call me with the lab reports and ultrasound chest x-ray report    I will be retiring next week he will follow with another physician    Return in about 3 months (around 4/4/2022).        Honey Ramirez MD  2:38 PM  1/4/2022     DE

## 2022-01-05 DIAGNOSIS — E11.9 TYPE 2 DIABETES MELLITUS WITHOUT COMPLICATION, WITHOUT LONG-TERM CURRENT USE OF INSULIN (HCC): ICD-10-CM

## 2022-01-05 DIAGNOSIS — E78.5 HYPERLIPIDEMIA, UNSPECIFIED HYPERLIPIDEMIA TYPE: ICD-10-CM

## 2022-01-05 DIAGNOSIS — E66.01 CLASS 3 SEVERE OBESITY DUE TO EXCESS CALORIES WITHOUT SERIOUS COMORBIDITY WITH BODY MASS INDEX (BMI) OF 40.0 TO 44.9 IN ADULT (HCC): ICD-10-CM

## 2022-01-05 LAB
ALBUMIN SERPL-MCNC: 3.9 G/DL (ref 3.5–5.2)
ALP BLD-CCNC: 177 U/L (ref 40–129)
ALT SERPL-CCNC: 14 U/L (ref 0–40)
ANION GAP SERPL CALCULATED.3IONS-SCNC: 12 MMOL/L (ref 7–16)
AST SERPL-CCNC: 18 U/L (ref 0–39)
BASOPHILS ABSOLUTE: 0.06 E9/L (ref 0–0.2)
BASOPHILS RELATIVE PERCENT: 1.1 % (ref 0–2)
BILIRUB SERPL-MCNC: 0.2 MG/DL (ref 0–1.2)
BUN BLDV-MCNC: 7 MG/DL (ref 6–20)
CALCIUM SERPL-MCNC: 9.4 MG/DL (ref 8.6–10.2)
CHLORIDE BLD-SCNC: 98 MMOL/L (ref 98–107)
CHOLESTEROL, TOTAL: 128 MG/DL (ref 0–199)
CO2: 26 MMOL/L (ref 22–29)
CREAT SERPL-MCNC: 0.8 MG/DL (ref 0.7–1.2)
EOSINOPHILS ABSOLUTE: 0.23 E9/L (ref 0.05–0.5)
EOSINOPHILS RELATIVE PERCENT: 4.1 % (ref 0–6)
GFR AFRICAN AMERICAN: >60
GFR NON-AFRICAN AMERICAN: >60 ML/MIN/1.73
GLUCOSE BLD-MCNC: 326 MG/DL (ref 74–99)
HBA1C MFR BLD: 14.9 % (ref 4–5.6)
HCT VFR BLD CALC: 46.2 % (ref 37–54)
HDLC SERPL-MCNC: 30 MG/DL
HEMOGLOBIN: 15.5 G/DL (ref 12.5–16.5)
IMMATURE GRANULOCYTES #: 0.02 E9/L
IMMATURE GRANULOCYTES %: 0.4 % (ref 0–5)
LDL CHOLESTEROL CALCULATED: 68 MG/DL (ref 0–99)
LYMPHOCYTES ABSOLUTE: 2.5 E9/L (ref 1.5–4)
LYMPHOCYTES RELATIVE PERCENT: 44.6 % (ref 20–42)
MCH RBC QN AUTO: 27.1 PG (ref 26–35)
MCHC RBC AUTO-ENTMCNC: 33.5 % (ref 32–34.5)
MCV RBC AUTO: 80.6 FL (ref 80–99.9)
MONOCYTES ABSOLUTE: 0.41 E9/L (ref 0.1–0.95)
MONOCYTES RELATIVE PERCENT: 7.3 % (ref 2–12)
NEUTROPHILS ABSOLUTE: 2.38 E9/L (ref 1.8–7.3)
NEUTROPHILS RELATIVE PERCENT: 42.5 % (ref 43–80)
PDW BLD-RTO: 13 FL (ref 11.5–15)
PLATELET # BLD: 324 E9/L (ref 130–450)
PMV BLD AUTO: 11.7 FL (ref 7–12)
POTASSIUM SERPL-SCNC: 4.3 MMOL/L (ref 3.5–5)
RBC # BLD: 5.73 E12/L (ref 3.8–5.8)
SODIUM BLD-SCNC: 136 MMOL/L (ref 132–146)
TOTAL PROTEIN: 7.4 G/DL (ref 6.4–8.3)
TRIGL SERPL-MCNC: 148 MG/DL (ref 0–149)
TSH SERPL DL<=0.05 MIU/L-ACNC: 1 UIU/ML (ref 0.27–4.2)
VLDLC SERPL CALC-MCNC: 30 MG/DL
WBC # BLD: 5.6 E9/L (ref 4.5–11.5)

## 2022-01-06 ENCOUNTER — TELEPHONE (OUTPATIENT)
Dept: FAMILY MEDICINE CLINIC | Age: 22
End: 2022-01-06

## 2022-01-06 NOTE — TELEPHONE ENCOUNTER
Patient called stating his [place] and Novolin R needs PA.     Last seen 1/4/2022  Next appt Visit date not found

## 2022-01-24 ENCOUNTER — APPOINTMENT (OUTPATIENT)
Dept: GENERAL RADIOLOGY | Age: 22
End: 2022-01-24
Payer: COMMERCIAL

## 2022-01-24 ENCOUNTER — HOSPITAL ENCOUNTER (EMERGENCY)
Age: 22
Discharge: HOME | End: 2022-01-24
Attending: EMERGENCY MEDICINE
Payer: COMMERCIAL

## 2022-01-24 VITALS
HEART RATE: 80 BPM | TEMPERATURE: 96.9 F | BODY MASS INDEX: 41.42 KG/M2 | SYSTOLIC BLOOD PRESSURE: 134 MMHG | RESPIRATION RATE: 18 BRPM | WEIGHT: 297 LBS | OXYGEN SATURATION: 98 % | DIASTOLIC BLOOD PRESSURE: 87 MMHG

## 2022-01-24 DIAGNOSIS — S20.229A CONTUSION OF BACK, UNSPECIFIED LATERALITY, INITIAL ENCOUNTER: Primary | ICD-10-CM

## 2022-01-24 PROCEDURE — 99283 EMERGENCY DEPT VISIT LOW MDM: CPT

## 2022-01-24 PROCEDURE — 72100 X-RAY EXAM L-S SPINE 2/3 VWS: CPT

## 2022-01-24 RX ORDER — CYCLOBENZAPRINE HCL 10 MG
10 TABLET ORAL 3 TIMES DAILY PRN
Qty: 15 TABLET | Refills: 0 | Status: SHIPPED | OUTPATIENT
Start: 2022-01-24 | End: 2022-01-29

## 2022-01-24 RX ORDER — IBUPROFEN 600 MG/1
600 TABLET ORAL EVERY 8 HOURS PRN
Qty: 30 TABLET | Refills: 0 | Status: SHIPPED | OUTPATIENT
Start: 2022-01-24 | End: 2022-06-20

## 2022-01-24 ASSESSMENT — PAIN DESCRIPTION - PAIN TYPE: TYPE: ACUTE PAIN

## 2022-01-24 ASSESSMENT — PAIN DESCRIPTION - FREQUENCY: FREQUENCY: CONTINUOUS

## 2022-01-24 ASSESSMENT — ENCOUNTER SYMPTOMS
WHEEZING: 0
EYE REDNESS: 0
NAUSEA: 0
SHORTNESS OF BREATH: 0
EYE DISCHARGE: 0
BACK PAIN: 1
DIARRHEA: 0
VOMITING: 0
COUGH: 0
SORE THROAT: 0
SINUS PRESSURE: 0
ABDOMINAL PAIN: 0
EYE PAIN: 0

## 2022-01-24 ASSESSMENT — PAIN SCALES - GENERAL: PAINLEVEL_OUTOF10: 7

## 2022-01-24 ASSESSMENT — PAIN DESCRIPTION - LOCATION: LOCATION: BACK

## 2022-01-24 ASSESSMENT — PAIN DESCRIPTION - ORIENTATION: ORIENTATION: LOWER;POSTERIOR

## 2022-01-24 ASSESSMENT — PAIN DESCRIPTION - ONSET: ONSET: SUDDEN

## 2022-01-24 NOTE — ED PROVIDER NOTES
The history is provided by the patient. Back Pain  Location:  Lumbar spine  Quality:  Aching  Pain severity:  Moderate  Onset quality:  Sudden  Duration:  3 hours  Chronicity:  New  Context: falling    Associated symptoms: no abdominal pain, no chest pain, no dysuria, no fever, no headaches and no weakness         Review of Systems   Constitutional: Negative for chills and fever. HENT: Negative for ear pain, sinus pressure and sore throat. Eyes: Negative for pain, discharge and redness. Respiratory: Negative for cough, shortness of breath and wheezing. Cardiovascular: Negative for chest pain. Gastrointestinal: Negative for abdominal pain, diarrhea, nausea and vomiting. Genitourinary: Negative for dysuria and frequency. Musculoskeletal: Positive for back pain. Negative for arthralgias. Skin: Negative for rash and wound. Neurological: Negative for weakness and headaches. Hematological: Negative for adenopathy. All other systems reviewed and are negative. Physical Exam  Vitals and nursing note reviewed. Constitutional:       Appearance: He is well-developed. HENT:      Head: Normocephalic and atraumatic. Jaw: No trismus. Right Ear: Hearing and external ear normal.      Left Ear: Hearing and external ear normal.      Nose: Nose normal.      Right Sinus: No maxillary sinus tenderness or frontal sinus tenderness. Left Sinus: No maxillary sinus tenderness or frontal sinus tenderness. Mouth/Throat:      Pharynx: Uvula midline. No uvula swelling. Eyes:      General: Lids are normal.      Conjunctiva/sclera: Conjunctivae normal.      Pupils: Pupils are equal, round, and reactive to light. Cardiovascular:      Rate and Rhythm: Normal rate and regular rhythm. Heart sounds: Normal heart sounds. No murmur heard. Pulmonary:      Effort: Pulmonary effort is normal. No respiratory distress. Breath sounds: Normal breath sounds. No wheezing or rales. Abdominal:      General: Bowel sounds are normal.      Palpations: Abdomen is soft. Abdomen is not rigid. Tenderness: There is no abdominal tenderness. There is no guarding or rebound. Musculoskeletal:      Cervical back: Normal range of motion and neck supple. Lumbar back: Spasms and tenderness present. No bony tenderness. Back:    Skin:     General: Skin is warm and dry. Findings: No abrasion or rash. Neurological:      Mental Status: He is alert and oriented to person, place, and time. GCS: GCS eye subscore is 4. GCS verbal subscore is 5. GCS motor subscore is 6. Cranial Nerves: No cranial nerve deficit. Sensory: No sensory deficit. Coordination: Coordination normal.      Gait: Gait normal.          Procedures     MDM        --------------------------------------------- PAST HISTORY ---------------------------------------------  Past Medical History:  has a past medical history of Asthma, DKA (diabetic ketoacidoses), Hyperlipidemia, Hypertension, Obesity, Sinusitis, and Type 2 diabetes mellitus without complication ([facility] Past Surgical History:  has no past surgical history on file. Social History:  reports that he has been smoking e-cigarettes. He has never used smokeless tobacco. He reports current alcohol use. He reports current drug use. Drug: Marijuana Olekady Hicks). Family History: family history includes Asthma in his mother; Blindness in his father; Coronary Art Dis in his father; Diabetes in his father and mother; Heart Attack in his father; Heart Failure in his father and paternal grandfather; High Blood Pressure in his father and mother; High Cholesterol in his mother; Other in his father. The patients home medications have been reviewed. Allergies: Patient has no known allergies.     -------------------------------------------------- RESULTS -------------------------------------------------  Labs:  No results found for this visit on 01/24/22. Radiology:  XR LUMBAR SPINE (2-3 VIEWS)   Final Result   Unremarkable examination of the lumbar spine.             ------------------------- NURSING NOTES AND VITALS REVIEWED ---------------------------  Date / Time Roomed:  1/24/2022  1:36 PM  ED Bed Assignment:  04/04    The nursing notes within the ED encounter and vital signs as below have been reviewed. /87   Pulse 80   Temp 96.9 °F (36.1 °C) (Temporal)   Resp 18   Wt 297 lb (134.7 kg)   SpO2 98%   BMI 41.42 kg/m²   Oxygen Saturation Interpretation: Normal      ------------------------------------------ PROGRESS NOTES ------------------------------------------  I have spoken with the patient and discussed todays results, in addition to providing specific details for the plan of care and counseling regarding the diagnosis and prognosis. Their questions are answered at this time and they are agreeable with the plan. I discussed at length with them reasons for immediate return here for re evaluation. They will followup with primary care by calling their office tomorrow. --------------------------------- ADDITIONAL PROVIDER NOTES ---------------------------------  At this time the patient is without objective evidence of an acute process requiring hospitalization or inpatient management. They have remained hemodynamically stable throughout their entire ED visit and are stable for discharge with outpatient follow-up. The plan has been discussed in detail and they are aware of the specific conditions for emergent return, as well as the importance of follow-up. New Prescriptions    CYCLOBENZAPRINE (FLEXERIL) 10 MG TABLET    Take 1 tablet by mouth 3 times daily as needed for Muscle spasms    IBUPROFEN (IBU) 600 MG TABLET    Take 1 tablet by mouth every 8 hours as needed for Pain       Diagnosis:  1.  Contusion of back, unspecified laterality, initial encounter        Disposition:  Patient's disposition: Discharge to home  Patient's condition is stable.                     Rosa Isela Pradhan MD  01/24/22 0104

## 2022-02-16 LAB
HAV IGM SER IA-ACNC: NORMAL
HEPATITIS B CORE IGM ANTIBODY: NORMAL
HEPATITIS B SURFACE ANTIGEN INTERPRETATION: NORMAL
HEPATITIS C ANTIBODY INTERPRETATION: NORMAL
HIV-1 AND HIV-2 ANTIBODIES: NORMAL

## 2022-02-24 ENCOUNTER — HOSPITAL ENCOUNTER (EMERGENCY)
Age: 22
Discharge: HOME | End: 2022-02-25
Payer: COMMERCIAL

## 2022-02-24 DIAGNOSIS — R73.9 HYPERGLYCEMIA: ICD-10-CM

## 2022-02-24 DIAGNOSIS — R11.2 NON-INTRACTABLE VOMITING WITH NAUSEA, UNSPECIFIED VOMITING TYPE: Primary | ICD-10-CM

## 2022-02-24 LAB
ALBUMIN SERPL-MCNC: 3.7 G/DL (ref 3.5–5.2)
ALP BLD-CCNC: 154 U/L (ref 40–129)
ALT SERPL-CCNC: 16 U/L (ref 0–40)
ANION GAP SERPL CALCULATED.3IONS-SCNC: 10 MMOL/L (ref 7–16)
AST SERPL-CCNC: 12 U/L (ref 0–39)
BASOPHILS ABSOLUTE: 0.05 E9/L (ref 0–0.2)
BASOPHILS RELATIVE PERCENT: 0.8 % (ref 0–2)
BILIRUB SERPL-MCNC: 0.3 MG/DL (ref 0–1.2)
BUN BLDV-MCNC: 11 MG/DL (ref 6–20)
CALCIUM SERPL-MCNC: 9.2 MG/DL (ref 8.6–10.2)
CHLORIDE BLD-SCNC: 100 MMOL/L (ref 98–107)
CO2: 25 MMOL/L (ref 22–29)
CREAT SERPL-MCNC: 0.9 MG/DL (ref 0.7–1.2)
EOSINOPHILS ABSOLUTE: 0.18 E9/L (ref 0.05–0.5)
EOSINOPHILS RELATIVE PERCENT: 2.8 % (ref 0–6)
GFR AFRICAN AMERICAN: >60
GFR NON-AFRICAN AMERICAN: >60 ML/MIN/1.73
GLUCOSE BLD-MCNC: 363 MG/DL (ref 74–99)
HCT VFR BLD CALC: 46 % (ref 37–54)
HEMOGLOBIN: 15 G/DL (ref 12.5–16.5)
IMMATURE GRANULOCYTES #: 0.03 E9/L
IMMATURE GRANULOCYTES %: 0.5 % (ref 0–5)
LYMPHOCYTES ABSOLUTE: 2.6 E9/L (ref 1.5–4)
LYMPHOCYTES RELATIVE PERCENT: 40.8 % (ref 20–42)
MCH RBC QN AUTO: 26.6 PG (ref 26–35)
MCHC RBC AUTO-ENTMCNC: 32.6 % (ref 32–34.5)
MCV RBC AUTO: 81.6 FL (ref 80–99.9)
MONOCYTES ABSOLUTE: 0.49 E9/L (ref 0.1–0.95)
MONOCYTES RELATIVE PERCENT: 7.7 % (ref 2–12)
NEUTROPHILS ABSOLUTE: 3.02 E9/L (ref 1.8–7.3)
NEUTROPHILS RELATIVE PERCENT: 47.4 % (ref 43–80)
PDW BLD-RTO: 13.2 FL (ref 11.5–15)
PLATELET # BLD: 326 E9/L (ref 130–450)
PMV BLD AUTO: 10.8 FL (ref 7–12)
POTASSIUM REFLEX MAGNESIUM: 4.2 MMOL/L (ref 3.5–5)
RBC # BLD: 5.64 E12/L (ref 3.8–5.8)
SODIUM BLD-SCNC: 135 MMOL/L (ref 132–146)
TOTAL PROTEIN: 7.1 G/DL (ref 6.4–8.3)
WBC # BLD: 6.4 E9/L (ref 4.5–11.5)

## 2022-02-24 PROCEDURE — 2580000003 HC RX 258: Performed by: PHYSICIAN ASSISTANT

## 2022-02-24 PROCEDURE — 85025 COMPLETE CBC W/AUTO DIFF WBC: CPT

## 2022-02-24 PROCEDURE — 96374 THER/PROPH/DIAG INJ IV PUSH: CPT

## 2022-02-24 PROCEDURE — 99283 EMERGENCY DEPT VISIT LOW MDM: CPT

## 2022-02-24 PROCEDURE — 36415 COLL VENOUS BLD VENIPUNCTURE: CPT

## 2022-02-24 PROCEDURE — 80053 COMPREHEN METABOLIC PANEL: CPT

## 2022-02-24 PROCEDURE — 6360000002 HC RX W HCPCS: Performed by: PHYSICIAN ASSISTANT

## 2022-02-24 RX ORDER — ONDANSETRON 4 MG/1
4 TABLET, ORALLY DISINTEGRATING ORAL EVERY 8 HOURS PRN
Qty: 10 TABLET | Refills: 0 | Status: SHIPPED | OUTPATIENT
Start: 2022-02-24 | End: 2022-03-15

## 2022-02-24 RX ORDER — ONDANSETRON 2 MG/ML
4 INJECTION INTRAMUSCULAR; INTRAVENOUS ONCE
Status: COMPLETED | OUTPATIENT
Start: 2022-02-24 | End: 2022-02-24

## 2022-02-24 RX ORDER — 0.9 % SODIUM CHLORIDE 0.9 %
1000 INTRAVENOUS SOLUTION INTRAVENOUS ONCE
Status: COMPLETED | OUTPATIENT
Start: 2022-02-24 | End: 2022-02-25

## 2022-02-24 RX ADMIN — ONDANSETRON 4 MG: 2 INJECTION INTRAMUSCULAR; INTRAVENOUS at 23:05

## 2022-02-24 RX ADMIN — SODIUM CHLORIDE 1000 ML: 9 INJECTION, SOLUTION INTRAVENOUS at 23:06

## 2022-02-24 NOTE — Clinical Note
Sherine Phillips was seen and treated in our emergency department on 2/24/2022. He may return to work on 02/26/2022. If you have any questions or concerns, please don't hesitate to call.       IRMA Burnett

## 2022-02-24 NOTE — Clinical Note
Ashley Roy was seen and treated in our emergency department on 2/24/2022. He may return to work on 02/26/2022. If you have any questions or concerns, please don't hesitate to call.       Carlus Romberg, PA

## 2022-02-25 VITALS
BODY MASS INDEX: 41.16 KG/M2 | TEMPERATURE: 97.1 F | RESPIRATION RATE: 16 BRPM | HEIGHT: 71 IN | HEART RATE: 63 BPM | WEIGHT: 294 LBS | DIASTOLIC BLOOD PRESSURE: 105 MMHG | SYSTOLIC BLOOD PRESSURE: 143 MMHG | OXYGEN SATURATION: 96 %

## 2022-02-25 LAB
CHP ED QC CHECK: NORMAL
GLUCOSE BLD-MCNC: 263 MG/DL
METER GLUCOSE: 263 MG/DL (ref 74–99)

## 2022-02-25 PROCEDURE — 82962 GLUCOSE BLOOD TEST: CPT

## 2022-02-25 NOTE — ED PROVIDER NOTES
[facility]   HPI:  2/24/22, Time: 10:01 PM IRINEO Garcia is a 19 y.o. male presenting to the ED for Vomiting , beginning today. The complaint has been persistent, moderate in severity, and worsened by nothing. patient comes in with complaint of nausea vomiting. He denies any abdominal pain. No recent illness no runny nose congestion fever chills. Denies any urinary frequency urgency burning. Patient is diabetic. No sick contacts at home    Review of Systems:   A complete review of systems was performed and pertinent positives and negatives are stated within HPI, all other systems reviewed and are negative.          --------------------------------------------- PAST HISTORY ---------------------------------------------  Past Medical History:  has a past medical history of Asthma, DKA (diabetic ketoacidoses), Hyperlipidemia, Hypertension, Obesity, Sinusitis, and Type 2 diabetes mellitus without complication ([facility] [address] Past Surgical History:  has no past surgical history on file. Social History:  reports that he has been smoking e-cigarettes. He has never used smokeless tobacco. He reports current alcohol use. He reports current drug use. Drug: Marijuana Fredick Copping). Family History: family history includes Asthma in his mother; Blindness in his father; Coronary Art Dis in his father; Diabetes in his father and mother; Heart Attack in his father; Heart Failure in his father and paternal grandfather; High Blood Pressure in his father and mother; High Cholesterol in his mother; Other in his father. The patients home medications have been reviewed. Allergies: Patient has no known allergies.     -------------------------------------------------- RESULTS -------------------------------------------------  All laboratory and radiology results have been personally reviewed by myself   LABS:  Results for orders placed or performed during the hospital encounter of 02/24/22   CBC with Auto Differential   Result Value Ref Range    WBC 6.4 4.5 - 11.5 E9/L    RBC 5.64 3.80 - 5.80 E12/L    Hemoglobin 15.0 12.5 - 16.5 g/dL    Hematocrit 46.0 37.0 - 54.0 %    MCV 81.6 80.0 - 99.9 fL    MCH 26.6 26.0 - 35.0 pg    MCHC 32.6 32.0 - 34.5 %    RDW 13.2 11.5 - 15.0 fL    Platelets 725 780 - 643 E9/L    MPV 10.8 7.0 - 12.0 fL    Neutrophils % 47.4 43.0 - 80.0 %    Immature Granulocytes % 0.5 0.0 - 5.0 %    Lymphocytes % 40.8 20.0 - 42.0 %    Monocytes % 7.7 2.0 - 12.0 %    Eosinophils % 2.8 0.0 - 6.0 %    Basophils % 0.8 0.0 - 2.0 %    Neutrophils Absolute 3.02 1.80 - 7.30 E9/L    Immature Granulocytes # 0.03 E9/L    Lymphocytes Absolute 2.60 1.50 - 4.00 E9/L    Monocytes Absolute 0.49 0.10 - 0.95 E9/L    Eosinophils Absolute 0.18 0.05 - 0.50 E9/L    Basophils Absolute 0.05 0.00 - 0.20 E9/L   Comprehensive Metabolic Panel w/ Reflex to MG   Result Value Ref Range    Sodium 135 132 - 146 mmol/L    Potassium reflex Magnesium 4.2 3.5 - 5.0 mmol/L    Chloride 100 98 - 107 mmol/L    CO2 25 22 - 29 mmol/L    Anion Gap 10 7 - 16 mmol/L    Glucose 363 (H) 74 - 99 mg/dL    BUN 11 6 - 20 mg/dL    CREATININE 0.9 0.7 - 1.2 mg/dL    GFR Non-African American >60 >=60 mL/min/1.73    GFR African American >60     Calcium 9.2 8.6 - 10.2 mg/dL    Total Protein 7.1 6.4 - 8.3 g/dL    Albumin 3.7 3.5 - 5.2 g/dL    Total Bilirubin 0.3 0.0 - 1.2 mg/dL    Alkaline Phosphatase 154 (H) 40 - 129 U/L    ALT 16 0 - 40 U/L    AST 12 0 - 39 U/L   POCT Glucose   Result Value Ref Range    Glucose 263 mg/dL    QC OK? pass        RADIOLOGY:  Interpreted by Radiologist.  No orders to display       ------------------------- NURSING NOTES AND VITALS REVIEWED ---------------------------   The nursing notes within the ED encounter and vital signs as below have been reviewed.    BP (!) 170/101   Pulse 85   Temp 97.1 °F (36.2 °C) (Infrared)   Resp 18   Ht 5' 11\" (1.803 m)   Wt 294 lb (133.4 kg)   SpO2 100%   BMI 41.00 kg/m²   Oxygen Saturation Interpretation: Normal      ---------------------------------------------------PHYSICAL EXAM--------------------------------------      Constitutional/General: Alert and oriented x3, well appearing, non toxic in NAD  Head: Normocephalic and atraumatic  Eyes: PERRL, EOMI  Mouth: Oropharynx clear, handling secretions, no trismus  Neck: Supple, full ROM,   Pulmonary: Lungs clear to auscultation bilaterally, no wheezes, rales, or rhonchi. Not in respiratory distress  Cardiovascular:  Regular rate and rhythm, no murmurs, gallops, or rubs. 2+ distal pulses  Abdomen: Soft, non tender, non distended,   Extremities: Moves all extremities x 4. Warm and well perfused  Skin: warm and dry without rash  Neurologic: GCS 15,  Psych: Normal Affect      ------------------------------ ED COURSE/MEDICAL DECISION MAKING----------------------  Medications   0.9 % sodium chloride bolus (0 mLs IntraVENous Stopped 2/25/22 0026)   ondansetron (ZOFRAN) injection 4 mg (4 mg IntraVENous Given 2/24/22 2305)         ED COURSE:   Patient with elevated glucose normal anion gap. He was given IV fluids with plan for repeat glucose    Medical Decision Making:     Patient comes in with complaint of nausea vomiting denied any abdominal pain. Patient tolerated p.o. challenge was given IV fluids he was noted to have elevated glucose but normal anion gap. Does not appear to be DKA    Counseling: The emergency provider has spoken with the patient and discussed todays results, in addition to providing specific details for the plan of care and counseling regarding the diagnosis and prognosis. Questions are answered at this time and they are agreeable with the plan.      --------------------------------- IMPRESSION AND DISPOSITION ---------------------------------    IMPRESSION  1. Non-intractable vomiting with nausea, unspecified vomiting type    2.  Hyperglycemia        DISPOSITION  Disposition: Discharge to home  Patient condition is good      NOTE: This report was transcribed using voice recognition software.  Every effort was made to ensure accuracy; however, inadvertent computerized transcription errors may be present     Sharlene Mobley [place]  02/25/22 0028

## 2022-03-07 LAB
AVERAGE GLUCOSE: NORMAL
HBA1C MFR BLD: 13 %

## 2022-03-08 ENCOUNTER — TELEPHONE (OUTPATIENT)
Dept: FAMILY MEDICINE CLINIC | Age: 22
End: 2022-03-08

## 2022-03-08 NOTE — TELEPHONE ENCOUNTER
Earvin Holstein from [facility] called stating patient's A1C is 13.0. They will be faxing over results as well.

## 2022-03-15 ENCOUNTER — OFFICE VISIT (OUTPATIENT)
Dept: FAMILY MEDICINE CLINIC | Age: 22
End: 2022-03-15
Payer: COMMERCIAL

## 2022-03-15 ENCOUNTER — TELEPHONE (OUTPATIENT)
Dept: FAMILY MEDICINE CLINIC | Age: 22
End: 2022-03-15

## 2022-03-15 VITALS
RESPIRATION RATE: 16 BRPM | BODY MASS INDEX: 42.27 KG/M2 | WEIGHT: 301.9 LBS | HEART RATE: 76 BPM | DIASTOLIC BLOOD PRESSURE: 88 MMHG | HEIGHT: 71 IN | OXYGEN SATURATION: 99 % | TEMPERATURE: 97.6 F | SYSTOLIC BLOOD PRESSURE: 134 MMHG

## 2022-03-15 DIAGNOSIS — M21.42 PES PLANUS OF BOTH FEET: ICD-10-CM

## 2022-03-15 DIAGNOSIS — E11.65 TYPE 2 DIABETES MELLITUS WITH HYPERGLYCEMIA, WITH LONG-TERM CURRENT USE OF INSULIN (HCC): ICD-10-CM

## 2022-03-15 DIAGNOSIS — T30.0 FRICTION BURN: ICD-10-CM

## 2022-03-15 DIAGNOSIS — E11.9 ENCOUNTER FOR DIABETIC FOOT EXAM (HCC): ICD-10-CM

## 2022-03-15 DIAGNOSIS — E11.9 TYPE 2 DIABETES MELLITUS WITHOUT COMPLICATION, WITHOUT LONG-TERM CURRENT USE OF INSULIN (HCC): Primary | ICD-10-CM

## 2022-03-15 DIAGNOSIS — Z91.199 NONCOMPLIANCE WITH DIABETES TREATMENT: ICD-10-CM

## 2022-03-15 DIAGNOSIS — Z00.01 ENCOUNTER FOR ROUTINE ADULT MEDICAL EXAM WITH ABNORMAL FINDINGS: ICD-10-CM

## 2022-03-15 DIAGNOSIS — Z71.82 EXERCISE COUNSELING: ICD-10-CM

## 2022-03-15 DIAGNOSIS — B37.49 CANDIDAL BALANOPOSTHITIS: ICD-10-CM

## 2022-03-15 DIAGNOSIS — M21.41 PES PLANUS OF BOTH FEET: ICD-10-CM

## 2022-03-15 DIAGNOSIS — Z71.3 DIETARY COUNSELING: ICD-10-CM

## 2022-03-15 DIAGNOSIS — Z79.4 TYPE 2 DIABETES MELLITUS WITH HYPERGLYCEMIA, WITH LONG-TERM CURRENT USE OF INSULIN (HCC): ICD-10-CM

## 2022-03-15 PROBLEM — M21.40 FLAT FOOT: Status: ACTIVE | Noted: 2022-03-15

## 2022-03-15 LAB
CREATININE URINE POCT: NORMAL
DIABETIC RETINOPATHY: NEGATIVE
MICROALBUMIN/CREAT 24H UR: NORMAL MG/G{CREAT}
MICROALBUMIN/CREAT UR-RTO: NORMAL

## 2022-03-15 PROCEDURE — 90732 PPSV23 VACC 2 YRS+ SUBQ/IM: CPT | Performed by: SURGERY

## 2022-03-15 PROCEDURE — 90471 IMMUNIZATION ADMIN: CPT | Performed by: SURGERY

## 2022-03-15 PROCEDURE — 99214 OFFICE O/P EST MOD 30 MIN: CPT | Performed by: SURGERY

## 2022-03-15 PROCEDURE — 82044 UR ALBUMIN SEMIQUANTITATIVE: CPT | Performed by: SURGERY

## 2022-03-15 PROCEDURE — 90715 TDAP VACCINE 7 YRS/> IM: CPT | Performed by: SURGERY

## 2022-03-15 PROCEDURE — 90472 IMMUNIZATION ADMIN EACH ADD: CPT | Performed by: SURGERY

## 2022-03-15 RX ORDER — MICONAZOLE NITRATE, ZINC OXIDE, WHITE PETROLATUM 2.5; 150; 813.5 MG/G; MG/G; MG/G
1 OINTMENT TOPICAL 2 TIMES DAILY
Qty: 50 G | Refills: 0 | Status: SHIPPED | OUTPATIENT
Start: 2022-03-15 | End: 2022-03-29

## 2022-03-15 RX ORDER — LANCETS 30 GAUGE
1 EACH MISCELLANEOUS 2 TIMES DAILY
Qty: 300 EACH | Refills: 1 | Status: SHIPPED | OUTPATIENT
Start: 2022-03-15

## 2022-03-15 RX ORDER — INSULIN DEGLUDEC INJECTION 100 U/ML
50 INJECTION, SOLUTION SUBCUTANEOUS 2 TIMES DAILY
Qty: 45 ML | Refills: 1 | Status: SHIPPED
Start: 2022-03-15 | End: 2022-03-18

## 2022-03-15 RX ORDER — FLASH GLUCOSE SENSOR
KIT MISCELLANEOUS
Qty: 1 EACH | Refills: 26 | Status: SHIPPED
Start: 2022-03-15 | End: 2022-04-25 | Stop reason: ALTCHOICE

## 2022-03-15 RX ORDER — BLOOD-GLUCOSE METER
1 KIT MISCELLANEOUS DAILY PRN
Qty: 1 KIT | Refills: 0 | Status: SHIPPED | OUTPATIENT
Start: 2022-03-15

## 2022-03-15 NOTE — ASSESSMENT & PLAN NOTE
Counseled the patient on importance of cardiovascular and resistance training. Make every attempt to engage in a level of activity, sustained for at least 30 minutes, where you saturate your undergarments with sweat. This should be done, at a minimum, three times per week.

## 2022-03-15 NOTE — ASSESSMENT & PLAN NOTE
During penetrative sex, using a lubricated condom can help prevent friction burns. As well as preventing penile friction burns, condoms can also help prevent unintended pregnancy and help reduce the chance of transmitting or amari an STI. Additionally, a person may wish to ask their partner to be gentler or to use lubrication during sexual activity. A good way to prevent friction burns on the penis during masturbation is to be gentler. Using a water-based lubricant can also help prevent friction burns. Many forms of water-based lubricant are available to purchase online. People can also take other steps to help reduce skin irritation that may cause friction burns, such as:    Proper hygiene: Keeping the penis clean by washing it daily with gentle soap and warm water can help prevent irritation. It also helps to wash underneath the foreskin, the base of the penis, and the testicles every day. Gentle and complete drying: After taking a shower, be sure to dry the penis thoroughly but gently. Instead of rubbing to dry the penis, try patting it dry with a soft towel. Proper clothing: Avoiding clothes that are too tight and not breathable can help prevent irritation, especially when exercising.

## 2022-03-15 NOTE — TELEPHONE ENCOUNTER
Pharmacy called stating   Tresiba  Miconazole  Novolin   Free style elisabeth sensors     All need PA     Last seen 3/15/2022  Next appt 6/15/2022

## 2022-03-15 NOTE — ASSESSMENT & PLAN NOTE
Counseled the patient on diet, continue to make positive choices. It is important to focus on meeting food group needs with nutrient dense foods and stay within caloric limits. Nutrient dense foods will provide you with vitamins, minerals, and other health promoting nutrients. You should avoid added sugars, saturated fats, hydrogenated oils, and limit sodium intake (this isn't just table salt. .. turn the package over, read the label, stay under 2300 mg or 2.3 g of total sodium daily). Track your calories, this will help you get an understanding of what a proper portion size is. Every day you should eat these:  -Veggies of all types  -Fruits  -Grains (strive for at least half of these to be whole-grain)  -Lean protein (poultry, fish, legumes, nuts)    Stick to the plate diet.    -89% of your dinner plate should be leafy green vegetables, dark green, red and orange vegetables. Do not use high-calorie dressing is a ranch or dressings that are filled with added sugars. 25% of your dinner plate should be whole grains. The remaining 25% of your dinner plate should be a lean protein. Limit your red meat intake to once per week and no more than 4 ounces in any serving.  -No need for second helpings. Limit or avoid these foods:  -Added sugars (less than 10% of your total calories in any given day should be from sugars)  -Saturated fats and hydrogenated oils (less than 10% of your total calories in any given day should be from these)  -Sodium (less than 2300 mg/day)  -Alcoholic beverages (even in moderation, alcohol can cause long-term health issues involving hypertension, electrolyte abnormalities, liver disease and even cancers of the mouth and throat)    Stay hydrated. Unless instructed otherwise by your physician, you should strive to drink at least eight 8 ounce glasses of water daily, some of these being fortified with electrolytes (such as a Pedialyte, or low calorie sports drink).   Again, avoid added sugars.

## 2022-03-15 NOTE — ASSESSMENT & PLAN NOTE
This condition can have topical treatment with seven days of miconazole 0.25% cream applied on each diaper change. Nystatin cream 900685 u/gram TID for two weeks is also an option. Stopping farxiga for now. Endocrinology may chose to restart. Moisture (sweat), which permits the microorganisms to thrive ensure that after showering or producing sweat that he will dry the area. Reduce risk of sexual transmission from partner indicates that they have vaginal thrush.

## 2022-03-15 NOTE — ASSESSMENT & PLAN NOTE
Counseled on diet. Patient is going to use my fitness pal and begin restricting carbohydrates to 45 g per meal.  Increase level of activity. Remain compliant to medications. Will defer to his endocrinology appointment for further management and optimization -I believe that as long as the patient is taking his medications as directed and following a strict diet that his A1c began to fall and he will obtain good control of his diabetes.

## 2022-03-15 NOTE — PROGRESS NOTES
Amy Echols (:  2000) is a 19 y.o. male,Established patient, here for evaluation of the following chief complaint(s):  Establish Care, Diabetes, and Health Maintenance (Due for Pneumococcal, Diabetic foot/Eye, Tdap)         ASSESSMENT/PLAN:  1. Type 2 diabetes mellitus without complication, without long-term current use of insulin (HCC)  -     POCT Microalbumin  -     Insulin Degludec (TRESIBA) 100 UNIT/ML SOLN; Inject 50 Units into the skin 2 times daily 42 units BID, Disp-45 mL, R-1Normal  -     insulin regular (NOVOLIN R) 100 UNIT/ML injection; Inject 20 Units into the skin 3 times daily (before meals), Disp-5 each, R-1Normal  -     metFORMIN (GLUCOPHAGE) 500 MG tablet; Take 1 tablet by mouth 2 times daily (with meals), Disp-180 tablet, R-1Normal  -     Continuous Blood Gluc Sensor (FREESTYLE SHE 14 DAY SENSOR) [facility] [place]; NDC# [address]-57 Sensor refills for 1 year. Apply as directed., Disp-1 each, R-26Normal  -     Blood Glucose Monitoring Suppl (ONE TOUCH ULTRA MINI) w/Device KIT; DAILY PRN Starting Tue 3/15/2022, Disp-1 kit, R-0, Normal  -     Lancets MISC; 2 TIMES DAILY Starting Tue 3/15/2022, Disp-300 each, R-1, Normal  -     blood glucose test strips (ASCENSIA AUTODISC VI;ONE TOUCH ULTRA TEST VI) strip; DAILY Starting Tue 3/15/2022, Disp-100 each, R-3, NormalAs needed. -     Sara Mills DPM, Podiatry, [place]  -     Miconazole-Zinc Oxide-Petrolat 0.25-15-81.35 % OINT; Apply 1 each topically in the morning and at bedtime for 14 days, Disp-50 g, R-0Normal  2. Noncompliance with diabetes treatment  -     Continuous Blood Gluc Sensor (FREESTYLE SHE 14 DAY SENSOR) [facility] [place]; NDC# [address]-58 Sensor refills for 1 year. Apply as directed., Disp-1 each, R-26Normal  3. Pes planus of both feet  -     Annetta Beaver, [place], Podiatry, [place]  4. Friction burn  Assessment & Plan:        During penetrative sex, using a lubricated condom can help prevent friction burns.  As well as preventing penile friction burns, condoms can also help prevent unintended pregnancy and help reduce the chance of transmitting or amari an STI. Additionally, a person may wish to ask their partner to be gentler or to use lubrication during sexual activity. A good way to prevent friction burns on the penis during masturbation is to be gentler. Using a water-based lubricant can also help prevent friction burns. Many forms of water-based lubricant are available to purchase online. People can also take other steps to help reduce skin irritation that may cause friction burns, such as:    Proper hygiene: Keeping the penis clean by washing it daily with gentle soap and warm water can help prevent irritation. It also helps to wash underneath the foreskin, the base of the penis, and the testicles every day. Gentle and complete drying: After taking a shower, be sure to dry the penis thoroughly but gently. Instead of rubbing to dry the penis, try patting it dry with a soft towel. Proper clothing: Avoiding clothes that are too tight and not breathable can help prevent irritation, especially when exercising. 5. Candidal balanoposthitis  Assessment & Plan: This condition can have topical treatment with seven days of miconazole 0.25% cream applied on each diaper change. Nystatin cream 205899 u/gram TID for two weeks is also an option. Stopping farxiga for now. Endocrinology may chose to restart. Moisture (sweat), which permits the microorganisms to thrive ensure that after showering or producing sweat that he will dry the area. Reduce risk of sexual transmission from partner indicates that they have vaginal thrush. Orders:  -     Miconazole-Zinc Oxide-Petrolat 0.25-15-81.35 % OINT; Apply 1 each topically in the morning and at bedtime for 14 days, Disp-50 g, R-0Normal  6. Encounter for diabetic foot exam ([facility] [address]  7.  Dietary counseling  Assessment & Plan:        Counseled the patient on diet, continue to make positive choices. It is important to focus on meeting food group needs with nutrient dense foods and stay within caloric limits. Nutrient dense foods will provide you with vitamins, minerals, and other health promoting nutrients. You should avoid added sugars, saturated fats, hydrogenated oils, and limit sodium intake (this isn't just table salt. .. turn the package over, read the label, stay under 2300 mg or 2.3 g of total sodium daily). Track your calories, this will help you get an understanding of what a proper portion size is. Every day you should eat these:  -Veggies of all types  -Fruits  -Grains (strive for at least half of these to be whole-grain)  -Lean protein (poultry, fish, legumes, nuts)    Stick to the plate diet.    -26% of your dinner plate should be leafy green vegetables, dark green, red and orange vegetables. Do not use high-calorie dressing is a ranch or dressings that are filled with added sugars. 25% of your dinner plate should be whole grains. The remaining 25% of your dinner plate should be a lean protein. Limit your red meat intake to once per week and no more than 4 ounces in any serving.  -No need for second helpings. Limit or avoid these foods:  -Added sugars (less than 10% of your total calories in any given day should be from sugars)  -Saturated fats and hydrogenated oils (less than 10% of your total calories in any given day should be from these)  -Sodium (less than 2300 mg/day)  -Alcoholic beverages (even in moderation, alcohol can cause long-term health issues involving hypertension, electrolyte abnormalities, liver disease and even cancers of the mouth and throat)    Stay hydrated. Unless instructed otherwise by your physician, you should strive to drink at least eight 8 ounce glasses of water daily, some of these being fortified with electrolytes (such as a Pedialyte, or low calorie sports drink). Again, avoid added sugars.     8. Exercise counseling  Assessment & Plan:        Counseled the patient on importance of cardiovascular and resistance training. Make every attempt to engage in a level of activity, sustained for at least 30 minutes, where you saturate your undergarments with sweat. This should be done, at a minimum, three times per week. 9. Encounter for routine adult medical exam with abnormal findings  10. Type 2 diabetes mellitus with hyperglycemia, with long[facility]term [facility]  Assessment & Plan:        Counseled on diet. Patient is going to use my fitness pal and begin restricting carbohydrates to 45 g per meal.  Increase level of activity. Remain compliant to medications. Will defer to his endocrinology appointment for further management and optimization -I believe that as long as the patient is taking his medications as directed and following a strict diet that his A1c began to fall and he will obtain good control of his diabetes. Return in about 3 months (around 6/15/2022) for diabetic checkup. Subjective   SUBJECTIVE/OBJECTIVE:  HPI:    DM2:      Has been inconsistent and not at all compliant with therapy:     -Reena Rc has been using just prior to meals     -regular insulin has not been using at all (just ran out over the last week or two) but was not consistent before this     -Metformin (patient was unaware to take with full meal, has been taking daily)     -Farxiga qam (has been taking daily)    Metformin 500mg BID with meals  Farxiga 5mg qam    TDI: 68.5 units  50 units BID Insulin degludec is an ultralong-acting basal insulin  20 units insulin regular 20 units TID before meals  Patient has never corrected for carbs/I:C, ISF. He has a girlfriend that is discouraging him from losing weight or following any particular meal plan. Patient's diet is rather heavy in carbohydrates in excess of 200 g/day, not balanced.   He feels that his girlfriend is worried that if he continues to lose weight or exudate. NECK: Trachea is midline, supple, nontender, no lymphadenopathy. CARDIO: Regular rate and rhythm without murmur rub or gallop. Cap refill 2+. Radial pulses 2+ bilaterally. RESPIRATORY: Clear to auscultation bilaterally without wheezes rales or rhonchi. Normal inspiratory and expiratory effort. Normoxic on room air    ABD: Obese, normoactive bowel sounds. Soft, nontender, no organomegaly. MSK: Structurally appropriate for age. No gross deficit. NEURO: Alert, no gross deficit. PSYCH:  Mood is normal and congruent with affect. No signs of psychomotor retardation or agitation. Thought content seems normal, speech is fluent and non-pressured. SKIN: Generally warm pink and dry. Acanthosis nigricans. Onychomycosis of feet. Area about the patient's glans penis as well as the extra foreskin that he has (he did have circumcision as a child) is edematous, erythematous, fissured, scaly patchy distribution. No phimosis. Diabetic foot exam  Visual inspection:  Deformity/amputation: absent  Skin lesions/pre-ulcerative calluses: absent  Edema: right- negative, left- negative    Sensory exam:  Monofilament sensation: normal  (minimum of 5 random plantar locations tested, avoiding callused areas - > 1 area with absence of sensation is + for neuropathy)    Plus at least one of the following:  Pulses: normal,   Pinprick: Intact  Proprioception: Intact  Vibration (128 Hz): N/A             An electronic signature was used to authenticate this note.     --Johnathon Ozuna DO

## 2022-03-18 ENCOUNTER — TELEPHONE (OUTPATIENT)
Dept: FAMILY MEDICINE CLINIC | Age: 22
End: 2022-03-18

## 2022-03-18 DIAGNOSIS — E11.65 TYPE 2 DIABETES MELLITUS WITH HYPERGLYCEMIA, WITH LONG-TERM CURRENT USE OF INSULIN (HCC): Primary | ICD-10-CM

## 2022-03-18 DIAGNOSIS — Z79.4 TYPE 2 DIABETES MELLITUS WITH HYPERGLYCEMIA, WITH LONG-TERM CURRENT USE OF INSULIN (HCC): Primary | ICD-10-CM

## 2022-03-18 DIAGNOSIS — E11.9 TYPE 2 DIABETES MELLITUS WITHOUT COMPLICATION, WITHOUT LONG-TERM CURRENT USE OF INSULIN (HCC): ICD-10-CM

## 2022-03-18 PROCEDURE — 3046F HEMOGLOBIN A1C LEVEL >9.0%: CPT | Performed by: SURGERY

## 2022-03-18 NOTE — TELEPHONE ENCOUNTER
Script for [employer] has two different units please verify and also says vials but should say pens and it was approved for pens they wanted to make sure you were ok with pens.      Last seen 3/15/2022  Next appt 6/15/2022

## 2022-03-18 NOTE — TELEPHONE ENCOUNTER
I fixed this prescription in the system. Ordered the pen. Dose should be 42 units subq once daily (not BID).

## 2022-03-23 DIAGNOSIS — Z79.4 TYPE 2 DIABETES MELLITUS WITH HYPERGLYCEMIA, WITH LONG-TERM CURRENT USE OF INSULIN (HCC): Primary | ICD-10-CM

## 2022-03-23 DIAGNOSIS — E11.65 TYPE 2 DIABETES MELLITUS WITH HYPERGLYCEMIA, WITH LONG-TERM CURRENT USE OF INSULIN (HCC): Primary | ICD-10-CM

## 2022-03-23 PROCEDURE — 3046F HEMOGLOBIN A1C LEVEL >9.0%: CPT | Performed by: SURGERY

## 2022-03-23 RX ORDER — INSULIN HUMAN 500 [IU]/ML
20 INJECTION, SOLUTION SUBCUTANEOUS
Qty: 3 ML | Refills: 3 | Status: SHIPPED
Start: 2022-03-23 | End: 2022-03-30

## 2022-03-30 ENCOUNTER — OFFICE VISIT (OUTPATIENT)
Dept: ENDOCRINOLOGY | Age: 22
End: 2022-03-30
Payer: COMMERCIAL

## 2022-03-30 VITALS
SYSTOLIC BLOOD PRESSURE: 145 MMHG | BODY MASS INDEX: 43.12 KG/M2 | OXYGEN SATURATION: 96 % | DIASTOLIC BLOOD PRESSURE: 81 MMHG | RESPIRATION RATE: 18 BRPM | HEIGHT: 71 IN | HEART RATE: 126 BPM | WEIGHT: 308 LBS

## 2022-03-30 DIAGNOSIS — E66.01 CLASS 3 SEVERE OBESITY DUE TO EXCESS CALORIES WITHOUT SERIOUS COMORBIDITY WITH BODY MASS INDEX (BMI) OF 40.0 TO 44.9 IN ADULT (HCC): ICD-10-CM

## 2022-03-30 DIAGNOSIS — E55.9 VITAMIN D DEFICIENCY: ICD-10-CM

## 2022-03-30 DIAGNOSIS — E11.65 UNCONTROLLED TYPE 2 DIABETES MELLITUS WITH HYPERGLYCEMIA (HCC): Primary | ICD-10-CM

## 2022-03-30 PROCEDURE — G8427 DOCREV CUR MEDS BY ELIG CLIN: HCPCS | Performed by: CLINICAL NURSE SPECIALIST

## 2022-03-30 PROCEDURE — 95251 CONT GLUC MNTR ANALYSIS I&R: CPT | Performed by: CLINICAL NURSE SPECIALIST

## 2022-03-30 PROCEDURE — 3046F HEMOGLOBIN A1C LEVEL >9.0%: CPT | Performed by: CLINICAL NURSE SPECIALIST

## 2022-03-30 PROCEDURE — G8417 CALC BMI ABV UP PARAM F/U: HCPCS | Performed by: CLINICAL NURSE SPECIALIST

## 2022-03-30 PROCEDURE — 2022F DILAT RTA XM EVC RTNOPTHY: CPT | Performed by: CLINICAL NURSE SPECIALIST

## 2022-03-30 PROCEDURE — 1036F TOBACCO NON-USER: CPT | Performed by: CLINICAL NURSE SPECIALIST

## 2022-03-30 PROCEDURE — G8482 FLU IMMUNIZE ORDER/ADMIN: HCPCS | Performed by: CLINICAL NURSE SPECIALIST

## 2022-03-30 PROCEDURE — 99205 OFFICE O/P NEW HI 60 MIN: CPT | Performed by: CLINICAL NURSE SPECIALIST

## 2022-03-30 RX ORDER — INSULIN LISPRO 100 [IU]/ML
INJECTION, SOLUTION INTRAVENOUS; SUBCUTANEOUS
Qty: 10 PEN | Refills: 3 | Status: SHIPPED | OUTPATIENT
Start: 2022-03-30

## 2022-03-30 NOTE — PROGRESS NOTES
[address] [place] [address] Department of Endocrinology Diabetes and Metabolism   [address] [facility] [address]   Phone: [phone]  Fax: [phone]    Date of Service: 3/31/2022    Primary Care Physician: Melyssa Lafleur DO  Referring physician: Danilo Alonzo MD  Provider: TRICIA Andrews     Reason for the visit:  Type 2 DM       History of Present Illness: The history is provided by the patient. No  was used. Accuracy of the patient data is excellent. Elia Chow is a very pleasant 23 y.o. male seen today for diabetes management     Elia Chow was diagnosed with diabetes at age of 21  and currently on Humulin R U 500 20 units  With meals usually twice per day  , Tresiba 50 units in am, Metformin 500 mg 1 tablet BID    The patient has been checking blood sugar uses elisabeth/meter   Checks BG 3 times per day.     Average elisabeth 52%  No hypoglycemia   Was non complaint however is taking medications as prescribed     Most recent A1c results summarized below  Lab Results   Component Value Date    LABA1C 13.0 03/07/2022    LABA1C 14.9 01/05/2022       Patient has had no hypoglycemic episodes   The patient hasn't been mindful of what has been eating and wasn't following diabetes diet    I reviewed current medications and the patient has no issues with diabetes medications  Elia Chow is up to date with eye exam and denied any history of diabetic retinopathy     And also performs  own feet care  Microvascular complications:  No Retinopathy, Nephropathy or Neuropathy   Macrovascular complications: no CAD, PVD, or Stroke  The patient receives Flushot every year    PAST MEDICAL HISTORY   Past Medical History:   Diagnosis Date    Asthma     DKA (diabetic ketoacidoses) 2019    Hyperlipidemia     Hypertension     Obesity     Sinusitis     Type 2 diabetes mellitus without complication ([facility] [address]        PAST SURGICAL HISTORY   No past surgical history on file.    SOCIAL HISTORY   Tobacco:   reports that he quit smoking about 2 months ago. His smoking use included e-cigarettes. He has a 1.00 pack-year smoking history. He has never used smokeless tobacco.  Alcohol:   reports current alcohol use. Drugs:   reports current drug use. Drug: Marijuana [place] Piper). FAMILY HISTORY   Family History   Problem Relation Age of Onset    Asthma Mother     Diabetes Mother     High Blood Pressure Mother     High Cholesterol Mother     Heart Attack Father     Heart Failure Father     Coronary Art Dis Father     Other Father         renal failure- dialysis     High Blood Pressure Father     Diabetes Father     Blindness Father     Heart Failure Paternal Grandfather        ALLERGIES AND DRUG REACTIONS   No Known Allergies    CURRENT MEDICATIONS   Current Outpatient Medications   Medication Sig Dispense Refill    insulin lispro, 1 Unit Dial, (HUMALOG KWIKPEN) 100 UNIT/ML SOPN 20 units TID with meals plus mod dose ISS max dose per day 90 units 10 pen 3    Insulin Degludec 200 UNIT/ML SOPN Inject 42 Units into the skin daily 3 mL 5    metFORMIN (GLUCOPHAGE) 500 MG tablet Take 1 tablet by mouth 2 times daily (with meals) 180 tablet 1    Continuous Blood Gluc Sensor (FREESTYLE SHE 14 DAY SENSOR) [facility]# [address]-08 Sensor refills for 1 year. Apply as directed. 1 each 26    Blood Glucose Monitoring Suppl (ONE TOUCH ULTRA MINI) w/Device KIT 1 kit by Does not apply route daily as needed (hyper or hypoglycemic s/s) 1 kit 0    Lancets MISC 1 each by Does not apply route 2 times daily 300 each 1    blood glucose test strips (ASCENSIA AUTODISC VI;ONE TOUCH ULTRA TEST VI) strip 1 each by In Vitro route daily As needed. 100 each 3    ibuprofen (IBU) 600 MG tablet Take 1 tablet by mouth every 8 hours as needed for Pain 30 tablet 0     No current facility-administered medications for this visit.        Review of Systems  Constitutional: No fever, no chills, no diaphoresis, no generalized weakness. HEENT: No blurred vision, No sore throat, no ear pain, no hair loss  Neck: denied any neck swelling, difficulty swallowing,   Cardio-pulmonary: No CP, SOB or palpitation, No orthopnea or PND. No cough or wheezing. GI: No N/V/D, no constipation, No abdominal pain, no melena or hematochezia   : Denied any dysuria, hematuria, flank pain, discharge, or incontinence. Skin: denied any rash, ulcer, Hirsute, or hyperpigmentation. MSK: denied any joint deformity, joint pain/swelling, muscle pain, or back pain. Neuro: no numbness, no tingling, no weakness, _    OBJECTIVE    BP (!) 145/81   Pulse 126   Resp 18   Ht 5' 11\" (1.803 m)   Wt (!) 308 lb (139.7 kg)   SpO2 96%   BMI 42.96 kg/m²   BP Readings from Last 4 Encounters:   03/30/22 (!) 145/81   03/15/22 134/88   02/25/22 (!) 143/105   01/24/22 134/87     Wt Readings from Last 6 Encounters:   03/30/22 (!) 308 lb (139.7 kg)   03/15/22 (!) 301 lb 14.4 oz (136.9 kg)   02/24/22 294 lb (133.4 kg)   01/24/22 297 lb (134.7 kg)   01/04/22 294 lb (133.4 kg)   11/14/21 (!) 315 lb 3.2 oz (143 kg)       Physical examination:  General: awake alert, oriented x3, no abnormal position or movements. HEENT: normocephalic non-traumatic, no exophthalmos   Neck: supple, no LN enlargement, no thyromegaly, no thyroid tenderness, no JVD. Pulm: Clear equal air entry no added sounds, no wheezing or rhonchi    CVS: S1 + S2, no murmur, no heave. Dorsalis pedis pulse palpable   Abd: soft lax, no tenderness, no organomegaly, audible bowel sounds. Skin: warm, no lesions, no rash.  No callus, no Ulcers, No acanthosis nigricans  Musculoskeletal: No back tenderness, no kyphosis/scoliosis    Neuro: CN intact, Monofilament sensation normal bilateral , muscle power normal  Psych: normal mood, and affect      Review of Laboratory Data:  I personally reviewed the following lab:  Lab Results   Component Value Date/Time    WBC 6.4 02/24/2022 10:41 PM    RBC 5.64 02/24/2022 10:41 PM    HGB 15.0 02/24/2022 10:41 PM    HCT 46.0 02/24/2022 10:41 PM    MCV 81.6 02/24/2022 10:41 PM    MCH 26.6 02/24/2022 10:41 PM    MCHC 32.6 02/24/2022 10:41 PM    RDW 13.2 02/24/2022 10:41 PM     02/24/2022 10:41 PM    MPV 10.8 02/24/2022 10:41 PM      Lab Results   Component Value Date/Time     02/24/2022 10:41 PM    K 4.2 02/24/2022 10:41 PM    CO2 25 02/24/2022 10:41 PM    BUN 11 02/24/2022 10:41 PM    CREATININE 0.9 02/24/2022 10:41 PM    CREATININE 1.0 03/27/2019 12:00 AM    CALCIUM 9.2 02/24/2022 10:41 PM    LABGLOM >60 02/24/2022 10:41 PM    GFRAA >60 02/24/2022 10:41 PM      Lab Results   Component Value Date/Time    TSH 0.998 01/05/2022 10:42 AM     Lab Results   Component Value Date    LABA1C 13.0 03/07/2022    GLUCOSE 263 02/25/2022    GLUCOSE 94 01/18/2012    MALBCR  mg/g 03/15/2022     Lab Results   Component Value Date    LABA1C 13.0 03/07/2022    LABA1C 14.9 01/05/2022     Lab Results   Component Value Date    TRIG 148 01/05/2022    HDL 30 01/05/2022    LDLCALC 68 01/05/2022    CHOL 128 01/05/2022     No results found for: Cris 30   Yonny Monahan, a 23 y.o.-old male seen in for the following issues       Assessment:      Diagnosis Orders   1. Uncontrolled type 2 diabetes mellitus with hyperglycemia ([facility]  C-Peptide    GLUTAMIC ACID DECARBOXYLASE    Comprehensive Metabolic Panel    [place] CONTINUOUS GLUCOSE MONITORING ANALYSIS I&R    [facility]   2. Class 3 severe obesity due to excess calories without serious comorbidity with body mass index (BMI) of 40.0 to 44.9 in adult ([facility] [address]     3. Vitamin D deficiency  Vitamin D 25 Hydroxy       Plan:     1.  Uncontrolled type 2 diabetes mellitus with hyperglycemia (HCC)   · Patient's diabetes is uncontrolled  · Plan: Increase U200 Tresiba to 60 units daily  · Stop Humulin R U500  · Start Humalog 20 units 3 times daily with meals plus moderate dose insulin sliding scale  · Continue metformin 500 mg twice daily  · We will check melody antibody, C-peptide, and fasting glucose  · The patient was advised to check blood sugars 4 times a day before meals and at bedtime and send BS readings to our office in a week. · Stanislav Joyner reviewed  · Discussed with patient A1c and blood sugar goals   · The patient counseled about the complications of uncontrolled diabetes   · The patient was referred to diabetic educator for further teaching   · Discussed lifestyle changes including diet and exercise with patient; recommended 150 minutes of moderate intensity exercise per week. · Diabetes labs before next visit    2. Class 3 severe obesity due to excess calories without serious comorbidity with body mass index (BMI) of 40.0 to 44.9 in [facility])   Discussed lifestyle changes including diet and exercise with patient in depth. Also discussed with patient cardiovascular risk associated with obesity   3. Vitamin D deficiency   Patient at risk for vitamin D deficiency. Will assess vitamin D     I personally spent greater than 60 minutes reviewing  external notes from PCP and other patient's care team providers, and personally interpreted labs associated with the above diagnosis. I also ordered labs to further assess and manage the above addressed medical conditions. Return in about 8 weeks (around 5/25/2022). The above issues were reviewed with the patient who understood and agreed with the plan. Thank you for allowing us to participate in the care of this patient. Please do not hesitate to contact us with any additional questions. TRICIA Castro     [facility] Diabetes Care and Endocrinology   [address] [place] [address]   Phone: [phone]  Fax: [phone]  --------------------------------------------  An electronic signature was used to authenticate this note.  TRICIA Castro on 3/31/2022 at 10:53 AM

## 2022-04-14 PROBLEM — Z00.01 ENCOUNTER FOR ROUTINE ADULT MEDICAL EXAM WITH ABNORMAL FINDINGS: Status: RESOLVED | Noted: 2022-03-15 | Resolved: 2022-04-14

## 2022-04-25 DIAGNOSIS — E11.65 UNCONTROLLED TYPE 2 DIABETES MELLITUS WITH HYPERGLYCEMIA (HCC): Primary | ICD-10-CM

## 2022-04-25 RX ORDER — FLASH GLUCOSE SENSOR
KIT MISCELLANEOUS
Qty: 2 EACH | Refills: 6 | Status: SHIPPED | OUTPATIENT
Start: 2022-04-25

## 2022-05-11 ENCOUNTER — PATIENT MESSAGE (OUTPATIENT)
Dept: ENDOCRINOLOGY | Age: 22
End: 2022-05-11

## 2022-05-11 ENCOUNTER — TELEPHONE (OUTPATIENT)
Dept: ENDOCRINOLOGY | Age: 22
End: 2022-05-11

## 2022-05-11 NOTE — TELEPHONE ENCOUNTER
April to May  4/17/22-> 127 @1:29p     4/19/22-> 136 @6:09p     4/20/22-> 186 @12:11p     4/20/22-> 131 @5:26p     4/20/22-> 119 @8:35p     4/21/22-> 173 @1:23p     5/3/22-> 113 @1:20p     5/9/22-> 150 @1:10p     5/11/22-> 130 @10:09a      · U200 Tresiba to 60 units daily  · Humalog 20 units 3 times daily with meals plus moderate dose insulin sliding scale  · metformin 500 mg twice daily

## 2022-06-02 ENCOUNTER — TELEPHONE (OUTPATIENT)
Dept: FAMILY MEDICINE CLINIC | Age: 22
End: 2022-06-02

## 2022-06-02 NOTE — TELEPHONE ENCOUNTER
His diabetes places him at high risk for severe covid infection. Can we get him infusion therapy setup?

## 2022-06-20 ENCOUNTER — OFFICE VISIT (OUTPATIENT)
Dept: FAMILY MEDICINE CLINIC | Age: 22
End: 2022-06-20
Payer: COMMERCIAL

## 2022-06-20 VITALS
RESPIRATION RATE: 16 BRPM | OXYGEN SATURATION: 96 % | TEMPERATURE: 97.6 F | WEIGHT: 286.2 LBS | SYSTOLIC BLOOD PRESSURE: 128 MMHG | DIASTOLIC BLOOD PRESSURE: 84 MMHG | HEART RATE: 84 BPM | HEIGHT: 71 IN | BODY MASS INDEX: 40.07 KG/M2

## 2022-06-20 DIAGNOSIS — E66.01 CLASS 3 SEVERE OBESITY DUE TO EXCESS CALORIES WITH SERIOUS COMORBIDITY AND BODY MASS INDEX (BMI) OF 45.0 TO 49.9 IN ADULT (HCC): ICD-10-CM

## 2022-06-20 DIAGNOSIS — Z79.4 TYPE 2 DIABETES MELLITUS WITH HYPERGLYCEMIA, WITH LONG-TERM CURRENT USE OF INSULIN (HCC): Primary | ICD-10-CM

## 2022-06-20 DIAGNOSIS — E55.9 VITAMIN D DEFICIENCY: ICD-10-CM

## 2022-06-20 DIAGNOSIS — E11.65 TYPE 2 DIABETES MELLITUS WITH HYPERGLYCEMIA, WITH LONG-TERM CURRENT USE OF INSULIN (HCC): Primary | ICD-10-CM

## 2022-06-20 DIAGNOSIS — E11.65 UNCONTROLLED TYPE 2 DIABETES MELLITUS WITH HYPERGLYCEMIA (HCC): ICD-10-CM

## 2022-06-20 LAB
ALBUMIN SERPL-MCNC: 4.4 G/DL (ref 3.5–5.2)
ALP BLD-CCNC: 104 U/L (ref 40–129)
ALT SERPL-CCNC: 29 U/L (ref 0–40)
ANION GAP SERPL CALCULATED.3IONS-SCNC: 18 MMOL/L (ref 7–16)
AST SERPL-CCNC: 20 U/L (ref 0–39)
BILIRUB SERPL-MCNC: 0.3 MG/DL (ref 0–1.2)
BUN BLDV-MCNC: 10 MG/DL (ref 6–20)
CALCIUM SERPL-MCNC: 9.5 MG/DL (ref 8.6–10.2)
CHLORIDE BLD-SCNC: 107 MMOL/L (ref 98–107)
CO2: 20 MMOL/L (ref 22–29)
CREAT SERPL-MCNC: 1 MG/DL (ref 0.7–1.2)
GFR AFRICAN AMERICAN: >60
GFR NON-AFRICAN AMERICAN: >60 ML/MIN/1.73
GLUCOSE BLD-MCNC: 150 MG/DL (ref 74–99)
HBA1C MFR BLD: 7.8 %
POTASSIUM SERPL-SCNC: 4.5 MMOL/L (ref 3.5–5)
SODIUM BLD-SCNC: 145 MMOL/L (ref 132–146)
TOTAL PROTEIN: 7.8 G/DL (ref 6.4–8.3)
VITAMIN D 25-HYDROXY: 13 NG/ML (ref 30–100)

## 2022-06-20 PROCEDURE — 99213 OFFICE O/P EST LOW 20 MIN: CPT | Performed by: SURGERY

## 2022-06-20 PROCEDURE — 83036 HEMOGLOBIN GLYCOSYLATED A1C: CPT | Performed by: SURGERY

## 2022-06-20 PROCEDURE — 3051F HG A1C>EQUAL 7.0%<8.0%: CPT | Performed by: SURGERY

## 2022-06-20 SDOH — ECONOMIC STABILITY: FOOD INSECURITY: WITHIN THE PAST 12 MONTHS, YOU WORRIED THAT YOUR FOOD WOULD RUN OUT BEFORE YOU GOT MONEY TO BUY MORE.: NEVER TRUE

## 2022-06-20 SDOH — ECONOMIC STABILITY: FOOD INSECURITY: WITHIN THE PAST 12 MONTHS, THE FOOD YOU BOUGHT JUST DIDN'T LAST AND YOU DIDN'T HAVE MONEY TO GET MORE.: NEVER TRUE

## 2022-06-20 ASSESSMENT — PATIENT HEALTH QUESTIONNAIRE - PHQ9
1. LITTLE INTEREST OR PLEASURE IN DOING THINGS: 0
SUM OF ALL RESPONSES TO PHQ9 QUESTIONS 1 & 2: 0
SUM OF ALL RESPONSES TO PHQ QUESTIONS 1-9: 0
2. FEELING DOWN, DEPRESSED OR HOPELESS: 0
SUM OF ALL RESPONSES TO PHQ QUESTIONS 1-9: 0

## 2022-06-20 ASSESSMENT — SOCIAL DETERMINANTS OF HEALTH (SDOH): HOW HARD IS IT FOR YOU TO PAY FOR THE VERY BASICS LIKE FOOD, HOUSING, MEDICAL CARE, AND HEATING?: NOT HARD AT ALL

## 2022-06-20 NOTE — PROGRESS NOTES
Jesse Nguyen (:  2000) is a 23 y.o. male,Established patient, here for evaluation of the following chief complaint(s):  Follow-up, Diabetes, Health Maintenance, and Foot Injury         ASSESSMENT/PLAN:  1. Type 2 diabetes mellitus with hyperglycemia, with long-term current use of insulin (HCC)  -     POCT glycosylated hemoglobin (Hb A1C)  -     Insulin Degludec 200 UNIT/ML SOPN; Inject 60 Units into the skin daily, Disp-3 pen, R-5Adjust Sig  2. Class 3 severe obesity due to excess calories with serious comorbidity and body mass index (BMI) of 45.0 to 49.9 in adult ([facility] [address]  3. Vitamin D deficiency    Keep up good work. No changes to medications. Get labs put in epic by Kimberley Lindsey. Return in about 3 months (around 2022) for diabetic checkup . Subjective   SUBJECTIVE/OBJECTIVE:  HPI:    1. Uncontrolled type 2 diabetes mellitus with hyperglycemia (HCC)   · A1c today is 7.8%  · Tresiba to 60 units daily  · Humalog 20 units 3 times daily with meals plus moderate dose insulin sliding scale  · Continue metformin 500 mg twice daily [taking with large meals]  · melody antibody, C-peptide not obtained yet  · Jillian: highest fasting , castillo 105. Today,  Non-fasted     Diet/Exercise:   -22 pound weight loss  -listening to diet advice from last visit  -walking daily       2. Class 3 severe obesity due to excess calories without serious comorbidity with body mass index (BMI) of 40.0 to 44.9 in adult [facility])     Reinforced diet concepts, increasing level of dedicated exercise. 3. Vitamin D deficiency   Patient at risk for vitamin D deficiency. Will assess vitamin D as soon as he gets lab.          Preventative:  Health Maintenance   Topic Date Due    Diabetic foot exam  Never done    Depression Screen  2022    Lipids  2023    Diabetic microalbuminuria test  03/15/2023    Pneumococcal 0-64 years Vaccine (2 - PCV) 03/15/2023    A1C test (Diabetic or Prediabetic)  2023    Diabetic retinal exam  03/15/2024    DTaP/Tdap/Td vaccine (8 - Td or Tdap) 03/15/2032    Hepatitis A vaccine  Completed    Hepatitis B vaccine  Completed    Hib vaccine  Completed    HPV vaccine  Completed    Varicella vaccine  Completed    Flu vaccine  Completed    COVID-19 Vaccine  Completed    Hepatitis C screen  Completed    HIV screen  Completed    Meningococcal (ACWY) vaccine  Aged Out           ROS:    Denies 10pt ROS other than noted in HPI. Objective       PHYSICAL EXAM:    /84   Pulse 84   Temp 97.6 °F (36.4 °C) (Temporal)   Resp 16   Ht 5' 11\" (1.803 m)   Wt 286 lb 3.2 oz (129.8 kg)   SpO2 96%   BMI 39.92 kg/m²     AVSS    GA: Well-groomed, appears well, no acute distress. HEENT: Atraumatic normocephalic. Extraocular muscles are grossly intact. Pupils are equal round reactive to light. Conjunctiva pink and moist.  Hearing is grossly intact. Nares patent without external drainage. Buccal mucosa pink and moist.  Posterior oropharynx clear without lesion or exudate. NECK: Trachea is midline, supple, nontender, no lymphadenopathy. CARDIO: Regular rate and rhythm without murmur rub or gallop. Cap refill 2+. Radial pulses 2+ bilaterally. RESPIRATORY: Clear to auscultation bilaterally without wheezes rales or rhonchi. Normal inspiratory and expiratory effort. Normoxic on room air    ABD: Rounded, normoactive bowel sounds. Soft, nontender, no organomegaly. MSK: Structurally appropriate for age. No gross deficit. NEURO: Alert, no gross deficit. PSYCH:  Mood is normal and congruent with affect. No signs of psychomotor retardation or agitation. Thought content seems normal, speech is fluent and non-pressured. SKIN: Generally warm pink and dry. An electronic signature was used to authenticate this note.     --Audrey Soares,

## 2022-06-20 NOTE — PATIENT INSTRUCTIONS
-Eat between 1200 and 1800 Calories per day to loose weight - keep in mind calorie restriction can lead to less insulin requirement  -Carbs limit to 45 to 60g per meal  -Fats limit to 60g per day (no more than 20g of saturated fats)  -Cholesterol limit to no more than 300mg per day  -Sodium less than 2000mg per day    MyFitnessPal or similar application (or track by journal) to monitor calories and macronutrients. This is the most critical component to a heart healthy, balanced diet: honesty, keeping oneself accountable, ensure you are tracking daily goals and meeting them. Food is medicine!

## 2022-06-24 LAB
C-PEPTIDE: 3.4 NG/ML (ref 0.5–3.3)
GLUTAMIC ACID DECARB AB: <5 IU/ML (ref 0–5)

## 2022-06-27 ENCOUNTER — TELEPHONE (OUTPATIENT)
Dept: ENDOCRINOLOGY | Age: 22
End: 2022-06-27

## 2022-06-27 RX ORDER — ERGOCALCIFEROL (VITAMIN D2) 1250 MCG
50000 CAPSULE ORAL WEEKLY
Qty: 4 CAPSULE | Refills: 2 | Status: SHIPPED | OUTPATIENT
Start: 2022-06-27

## 2022-06-27 NOTE — TELEPHONE ENCOUNTER
----- Message from TRICIA Sauceda sent at 6/27/2022  8:32 AM EDT -----  Please call patient and inform him labs are consistent with type 2 diabetes  His vitamin D is very low  Please have him start Drisdol 50,000 IU 1 tablet once weekly for 12 weeks  Prescription sent  Patient can then take vitamin D 2000 IU daily over-the-counter

## 2022-07-20 ENCOUNTER — HOSPITAL ENCOUNTER (EMERGENCY)
Age: 22
Discharge: HOME | End: 2022-07-20
Attending: EMERGENCY MEDICINE
Payer: COMMERCIAL

## 2022-07-20 ENCOUNTER — APPOINTMENT (OUTPATIENT)
Dept: GENERAL RADIOLOGY | Age: 22
End: 2022-07-20
Payer: COMMERCIAL

## 2022-07-20 VITALS
HEART RATE: 74 BPM | BODY MASS INDEX: 40.04 KG/M2 | RESPIRATION RATE: 16 BRPM | SYSTOLIC BLOOD PRESSURE: 138 MMHG | TEMPERATURE: 97.3 F | WEIGHT: 286 LBS | OXYGEN SATURATION: 99 % | DIASTOLIC BLOOD PRESSURE: 87 MMHG | HEIGHT: 71 IN

## 2022-07-20 DIAGNOSIS — S93.402A SPRAIN OF LEFT ANKLE, UNSPECIFIED LIGAMENT, INITIAL ENCOUNTER: Primary | ICD-10-CM

## 2022-07-20 PROCEDURE — 73630 X-RAY EXAM OF FOOT: CPT

## 2022-07-20 PROCEDURE — 99283 EMERGENCY DEPT VISIT LOW MDM: CPT

## 2022-07-20 PROCEDURE — 73610 X-RAY EXAM OF ANKLE: CPT

## 2022-07-20 RX ORDER — IBUPROFEN 600 MG/1
600 TABLET ORAL EVERY 8 HOURS PRN
Qty: 30 TABLET | Refills: 0 | Status: SHIPPED | OUTPATIENT
Start: 2022-07-20 | End: 2022-07-30

## 2022-07-20 ASSESSMENT — PAIN DESCRIPTION - DESCRIPTORS: DESCRIPTORS: BURNING;SORE

## 2022-07-20 ASSESSMENT — PAIN DESCRIPTION - FREQUENCY: FREQUENCY: CONTINUOUS

## 2022-07-20 ASSESSMENT — ENCOUNTER SYMPTOMS
SINUS PRESSURE: 0
VOMITING: 0
BACK PAIN: 0
COUGH: 0
NAUSEA: 0
EYE PAIN: 0
WHEEZING: 0
SORE THROAT: 0
EYE REDNESS: 0
DIARRHEA: 0
EYE DISCHARGE: 0
ABDOMINAL PAIN: 0
SHORTNESS OF BREATH: 0

## 2022-07-20 ASSESSMENT — PAIN DESCRIPTION - PAIN TYPE: TYPE: ACUTE PAIN

## 2022-07-20 ASSESSMENT — PAIN SCALES - GENERAL: PAINLEVEL_OUTOF10: 8

## 2022-07-20 ASSESSMENT — PAIN DESCRIPTION - LOCATION: LOCATION: ANKLE;FOOT

## 2022-07-20 ASSESSMENT — PAIN - FUNCTIONAL ASSESSMENT: PAIN_FUNCTIONAL_ASSESSMENT: 0-10

## 2022-07-20 ASSESSMENT — PAIN DESCRIPTION - ORIENTATION: ORIENTATION: LEFT

## 2022-07-20 NOTE — Clinical Note
Davida Jaime was seen and treated in our emergency department on 7/20/2022. He may return to work on 07/21/2022. If you have any questions or concerns, please don't hesitate to call.       Robin Lawrence MD

## 2022-07-20 NOTE — ED PROVIDER NOTES
The history is provided by the patient. Ankle Problem  Location:  Ankle and foot  Time since incident:  2 hours  Injury: yes    Mechanism of injury: fall    Fall:     Fall occurred:  Tripped and walking    Impact surface:  Hard floor  Ankle location:  L ankle  Foot location:  L foot  Pain details:     Quality:  Aching    Severity:  Moderate  Associated symptoms: no back pain and no fever       Review of Systems   Constitutional:  Negative for chills and fever. HENT:  Negative for ear pain, sinus pressure and sore throat. Eyes:  Negative for pain, discharge and redness. Respiratory:  Negative for cough, shortness of breath and wheezing. Cardiovascular:  Negative for chest pain. Gastrointestinal:  Negative for abdominal pain, diarrhea, nausea and vomiting. Genitourinary:  Negative for dysuria and frequency. Musculoskeletal:  Negative for arthralgias and back pain. Skin:  Negative for rash and wound. Neurological:  Negative for weakness and headaches. Hematological:  Negative for adenopathy. All other systems reviewed and are negative. Physical Exam  Vitals and nursing note reviewed. Constitutional:       Appearance: He is well-developed. HENT:      Head: Normocephalic and atraumatic. Jaw: No trismus. Right Ear: Hearing and external ear normal.      Left Ear: Hearing and external ear normal.      Nose: Nose normal.      Right Sinus: No maxillary sinus tenderness or frontal sinus tenderness. Left Sinus: No maxillary sinus tenderness or frontal sinus tenderness. Mouth/Throat:      Pharynx: Uvula midline. No uvula swelling. Eyes:      General: Lids are normal.      Conjunctiva/sclera: Conjunctivae normal.      Pupils: Pupils are equal, round, and reactive to light. Cardiovascular:      Rate and Rhythm: Normal rate and regular rhythm. Heart sounds: Normal heart sounds. No murmur heard.   Pulmonary:      Effort: Pulmonary effort is normal. No respiratory distress. Breath sounds: Normal breath sounds. No wheezing or rales. Abdominal:      General: Bowel sounds are normal.      Palpations: Abdomen is soft. Abdomen is not rigid. Tenderness: There is no abdominal tenderness. There is no guarding or rebound. Musculoskeletal:      Cervical back: Normal range of motion and neck supple. Left ankle: Swelling present. Tenderness present. Decreased range of motion. Left foot: Decreased range of motion. Swelling and tenderness present. Feet:    Skin:     General: Skin is warm and dry. Findings: No abrasion or rash. Neurological:      Mental Status: He is alert and oriented to person, place, and time. GCS: GCS eye subscore is 4. GCS verbal subscore is 5. GCS motor subscore is 6. Cranial Nerves: No cranial nerve deficit. Sensory: No sensory deficit. Coordination: Coordination normal.      Gait: Gait normal.        Procedures     MDM        --------------------------------------------- PAST HISTORY ---------------------------------------------  Past Medical History:  has a past medical history of Asthma, DKA (diabetic ketoacidoses), Hyperlipidemia, Hypertension, Obesity, Sinusitis, and Type 2 diabetes mellitus without complication ([facility] Past Surgical History:  has no past surgical history on file. Social History:  reports that he quit smoking about 6 months ago. His smoking use included e-cigarettes. He has a 1.00 pack-year smoking history. He has never used smokeless tobacco. He reports current alcohol use. He reports current drug use. Drug: Marijuana Champ Cue). Family History: family history includes Asthma in his mother; Blindness in his father; Coronary Art Dis in his father; Diabetes in his father and mother; Heart Attack in his father; Heart Failure in his father and paternal grandfather; High Blood Pressure in his father and mother; High Cholesterol in his mother; Other in his father.      The patients home medications have been reviewed. Allergies: Patient has no known allergies. -------------------------------------------------- RESULTS -------------------------------------------------  Labs:  No results found for this visit on 07/20/22. Radiology:  XR FOOT LEFT (MIN 3 VIEWS)   Final Result   No acute bony abnormality within either the left foot or ankle. XR ANKLE LEFT (MIN 3 VIEWS)   Final Result   No acute bony abnormality within either the left foot or ankle.             ------------------------- NURSING NOTES AND VITALS REVIEWED ---------------------------  Date / Time Roomed:  7/20/2022  9:17 AM  ED Bed Assignment:  04/04    The nursing notes within the ED encounter and vital signs as below have been reviewed. /87   Pulse 74   Temp 97.3 °F (36.3 °C) (Temporal)   Resp 16   Ht 5' 11\" (1.803 m)   Wt 286 lb (129.7 kg)   SpO2 99%   BMI 39.89 kg/m²   Oxygen Saturation Interpretation: Normal      ------------------------------------------ PROGRESS NOTES ------------------------------------------  I have spoken with the patient and discussed todays results, in addition to providing specific details for the plan of care and counseling regarding the diagnosis and prognosis. Their questions are answered at this time and they are agreeable with the plan. I discussed at length with them reasons for immediate return here for re evaluation. They will followup with primary care by calling their office tomorrow. Medications - No data to display      --------------------------------- ADDITIONAL PROVIDER NOTES ---------------------------------  At this time the patient is without objective evidence of an acute process requiring hospitalization or inpatient management. They have remained hemodynamically stable throughout their entire ED visit and are stable for discharge with outpatient follow-up.      The plan has been discussed in detail and they are aware of the specific conditions for emergent return, as well as the importance of follow-up. New Prescriptions    IBUPROFEN (IBU) 600 MG TABLET    Take 1 tablet by mouth every 8 hours as needed for Pain       Diagnosis:  1. Sprain of left ankle, unspecified ligament, initial encounter        Disposition:  Patient's disposition: Discharge to home  Patient's condition is stable.                     Yinka Hansen MD  07/20/22 [id number]

## 2022-11-28 ENCOUNTER — HOSPITAL ENCOUNTER (EMERGENCY)
Age: 22
Discharge: HOME | End: 2022-11-28
Attending: FAMILY MEDICINE
Payer: COMMERCIAL

## 2022-11-28 VITALS
HEART RATE: 60 BPM | WEIGHT: 285 LBS | HEIGHT: 71 IN | DIASTOLIC BLOOD PRESSURE: 88 MMHG | RESPIRATION RATE: 16 BRPM | TEMPERATURE: 97.1 F | SYSTOLIC BLOOD PRESSURE: 138 MMHG | OXYGEN SATURATION: 98 % | BODY MASS INDEX: 39.9 KG/M2

## 2022-11-28 DIAGNOSIS — R11.2 NAUSEA AND VOMITING, UNSPECIFIED VOMITING TYPE: Primary | ICD-10-CM

## 2022-11-28 PROCEDURE — 99283 EMERGENCY DEPT VISIT LOW MDM: CPT

## 2022-11-28 PROCEDURE — 6370000000 HC RX 637 (ALT 250 FOR IP): Performed by: FAMILY MEDICINE

## 2022-11-28 RX ORDER — ONDANSETRON 4 MG/1
TABLET, ORALLY DISINTEGRATING ORAL
Status: DISCONTINUED
Start: 2022-11-28 | End: 2022-11-28 | Stop reason: HOSPADM

## 2022-11-28 RX ORDER — ONDANSETRON 4 MG/1
4 TABLET, ORALLY DISINTEGRATING ORAL ONCE
Status: DISCONTINUED | OUTPATIENT
Start: 2022-11-28 | End: 2022-11-28

## 2022-11-28 RX ORDER — ONDANSETRON 4 MG/1
4 TABLET, FILM COATED ORAL EVERY 4 HOURS
Qty: 5 TABLET | Refills: 0 | Status: SHIPPED | OUTPATIENT
Start: 2022-11-28

## 2022-11-28 RX ORDER — ONDANSETRON 4 MG/1
4 TABLET, ORALLY DISINTEGRATING ORAL ONCE
Status: COMPLETED | OUTPATIENT
Start: 2022-11-28 | End: 2022-11-28

## 2022-11-28 RX ADMIN — ONDANSETRON 4 MG: 4 TABLET, ORALLY DISINTEGRATING ORAL at 12:32

## 2022-11-28 ASSESSMENT — PAIN SCALES - GENERAL: PAINLEVEL_OUTOF10: 4

## 2022-11-28 ASSESSMENT — PAIN DESCRIPTION - PAIN TYPE: TYPE: ACUTE PAIN

## 2022-11-28 ASSESSMENT — PAIN - FUNCTIONAL ASSESSMENT: PAIN_FUNCTIONAL_ASSESSMENT: 0-10

## 2022-11-28 ASSESSMENT — PAIN DESCRIPTION - FREQUENCY: FREQUENCY: INTERMITTENT

## 2022-11-28 ASSESSMENT — PAIN DESCRIPTION - LOCATION: LOCATION: ABDOMEN

## 2022-11-28 ASSESSMENT — PAIN DESCRIPTION - DESCRIPTORS: DESCRIPTORS: SORE

## 2022-11-28 NOTE — ED PROVIDER NOTES
HPI:  11/28/22,   Time: 12:25 PM IRINEO Martinez is a 23 y.o. male presenting to the ED for cute onset of nausea and vomiting that started this morning and has had a total of 3 emesis, last was around 7 AM.  A presently denies nausea. Denied fever chills or body aches. 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 He denies diarrhea. ROS:   Pertinent positives and negatives are stated within HPI, all other systems reviewed and are negative.  --------------------------------------------- PAST HISTORY ---------------------------------------------  Past Medical History:  has a past medical history of Asthma, DKA (diabetic ketoacidoses), Hyperlipidemia, Hypertension, Obesity, Sinusitis, and Type 2 diabetes mellitus without complication ([facility] [address] Past Surgical History:  has no past surgical history on file. Social History:  reports that he quit smoking about 10 months ago. His smoking use included e-cigarettes. He has a 1.00 pack-year smoking history. He has never used smokeless tobacco. He reports current alcohol use. He reports current drug use. Drug: Marijuana [place] Howard). Family History: family history includes Asthma in his mother; Blindness in his father; Coronary Art Dis in his father; Diabetes in his father and mother; Heart Attack in his father; Heart Failure in his father and paternal grandfather; High Blood Pressure in his father and mother; High Cholesterol in his mother; Other in his father. The patients home medications have been reviewed. Allergies: Patient has no known allergies. -------------------------------------------------- RESULTS -------------------------------------------------  All laboratory and radiology results have been personally reviewed by myself   LABS:  No results found for this visit on 11/28/22.     RADIOLOGY:  Interpreted by Radiologist.  No orders to display       ------------------------- NURSING NOTES AND VITALS REVIEWED DISPOSITION  Disposition: Discharge to home  Patient condition is stable                 Beena Rosario MD  11/28/22 [id number]

## 2022-11-28 NOTE — Clinical Note
Ashley Campbell was seen and treated in our emergency department on 11/28/2022. He may return to work on . If you have any questions or concerns, please don't hesitate to call.       Skylar Erazo MD

## 2022-11-28 NOTE — Clinical Note
Ozella Dakins was seen and treated in our emergency department on 11/28/2022. He may return to work on 11/29/2022. If you have any questions or concerns, please don't hesitate to call.       Mirna Lorenzana MD

## 2023-01-24 ENCOUNTER — HOSPITAL ENCOUNTER (EMERGENCY)
Age: 23
Discharge: HOME | End: 2023-01-24
Attending: EMERGENCY MEDICINE
Payer: COMMERCIAL

## 2023-01-24 ENCOUNTER — APPOINTMENT (OUTPATIENT)
Dept: GENERAL RADIOLOGY | Age: 23
End: 2023-01-24
Payer: COMMERCIAL

## 2023-01-24 VITALS
OXYGEN SATURATION: 99 % | TEMPERATURE: 98.2 F | RESPIRATION RATE: 17 BRPM | WEIGHT: 314 LBS | DIASTOLIC BLOOD PRESSURE: 86 MMHG | BODY MASS INDEX: 43.79 KG/M2 | HEART RATE: 98 BPM | SYSTOLIC BLOOD PRESSURE: 161 MMHG

## 2023-01-24 DIAGNOSIS — M25.512 ACUTE PAIN OF LEFT SHOULDER: Primary | ICD-10-CM

## 2023-01-24 PROCEDURE — 99284 EMERGENCY DEPT VISIT MOD MDM: CPT

## 2023-01-24 PROCEDURE — 73030 X-RAY EXAM OF SHOULDER: CPT

## 2023-01-24 PROCEDURE — 6360000002 HC RX W HCPCS: Performed by: EMERGENCY MEDICINE

## 2023-01-24 PROCEDURE — 96372 THER/PROPH/DIAG INJ SC/IM: CPT

## 2023-01-24 RX ORDER — IBUPROFEN 600 MG/1
600 TABLET ORAL EVERY 6 HOURS PRN
Qty: 40 TABLET | Refills: 0 | Status: SHIPPED | OUTPATIENT
Start: 2023-01-24 | End: 2023-02-03

## 2023-01-24 RX ORDER — KETOROLAC TROMETHAMINE 30 MG/ML
30 INJECTION, SOLUTION INTRAMUSCULAR; INTRAVENOUS ONCE
Status: COMPLETED | OUTPATIENT
Start: 2023-01-24 | End: 2023-01-24

## 2023-01-24 RX ADMIN — KETOROLAC TROMETHAMINE 30 MG: 30 INJECTION, SOLUTION INTRAMUSCULAR; INTRAVENOUS at 08:56

## 2023-01-24 ASSESSMENT — PAIN - FUNCTIONAL ASSESSMENT: PAIN_FUNCTIONAL_ASSESSMENT: 0-10

## 2023-01-24 ASSESSMENT — PAIN SCALES - GENERAL
PAINLEVEL_OUTOF10: 10
PAINLEVEL_OUTOF10: 8

## 2023-01-24 ASSESSMENT — PAIN DESCRIPTION - DESCRIPTORS
DESCRIPTORS: SHARP;STABBING
DESCRIPTORS: SHARP;ACHING

## 2023-01-24 ASSESSMENT — PAIN DESCRIPTION - LOCATION
LOCATION: SHOULDER
LOCATION: SHOULDER

## 2023-01-24 ASSESSMENT — PAIN DESCRIPTION - ORIENTATION
ORIENTATION: LEFT
ORIENTATION: LEFT

## 2023-01-24 ASSESSMENT — PAIN DESCRIPTION - FREQUENCY: FREQUENCY: CONTINUOUS

## 2023-01-24 NOTE — Clinical Note
Roberta Kirk was seen and treated in our emergency department on 1/24/2023. He may return to work on 01/25/2023. If you have any questions or concerns, please don't hesitate to call.       Kaylin Stephenson MD

## 2023-01-24 NOTE — ED PROVIDER NOTES
ED PROVIDER NOTE    Chief Complaint   Patient presents with    Shoulder Injury     Fall on shoulder about 3 weeks ago. Pt evaluated at [facility] at that time, states neg XR. Pt denies new injury, just states progressing pain for past 3 weeks. HPI:  23,   Time: 8:46 AM IRINEO Randle is a 23 y.o. male presenting to the ED for left shoulder pain. Symptom onset 3 weeks ago after slipping on ice and falling onto the left arm. Was seen at [facility] ED at that time, had negative plain films and discharged with instruction to follow-up with PCP to obtain an MRI. Patient has not yet followed up with his PCP. He was also prescribed ibuprofen at that time which she has not yet filled or taken. He is still having persistent pain in the left shoulder, occasionally radiates to the bicep, worse with movement. No associated numbness or weakness in the extremity. No neck pain. Chart review: hx of DM2, HTN, HLD, asthma    Reviewed last PCP note Dr. Natacha Garcia on 22:  Dx DM, obesity, vitamin D deficiency  A1c 7.8%    Review of Systems:     Review of Systems  Pertinent positives and negatives as stated in HPI      --------------------------------------------- PAST HISTORY ---------------------------------------------  Past Medical History:   Past Medical History:   Diagnosis Date    Asthma     DKA (diabetic ketoacidoses) 2019    Hyperlipidemia     Hypertension     Obesity     Sinusitis     Type 2 diabetes mellitus without complication ([facility] [address]        Past Surgical History:   History reviewed. No pertinent surgical history.     Social History:   Social History     Socioeconomic History    Marital status: Single     Spouse name: None    Number of children: None    Years of education: None    Highest education level: None   Tobacco Use    Smoking status: Former     Packs/day: 0.25     Years: 4.00     Pack years: 1.00     Types: E-Cigarettes, Cigarettes     Quit date:      Years since quittin.0 Smokeless tobacco: Never    Tobacco comments:     socially   Vaping Use    Vaping Use: Never used   Substance and Sexual Activity    Alcohol use: Yes     Comment: socially    Drug use: Yes     Types: Marijuana Saleemdo Schumacher     Comment: socially    Sexual activity: Never     Social Determinants of Health     Financial Resource Strain: Low Risk     Difficulty of Paying Living Expenses: Not hard at all   Food Insecurity: No Food Insecurity    Worried About Running Out of Food in the Last Year: Never true    Ran Out of Food in the Last Year: Never true       Family History:   Family History   Problem Relation Age of Onset    Asthma Mother     Diabetes Mother     High Blood Pressure Mother     High Cholesterol Mother     Heart Attack Father     Heart Failure Father     Coronary Art Dis Father     Other Father         renal failure- dialysis     High Blood Pressure Father     Diabetes Father     Blindness Father     Heart Failure Paternal Grandfather        The patients home medications have been reviewed. Allergies:   No Known Allergies        ---------------------------------------------------PHYSICAL EXAM--------------------------------------    BP (!) 161/86   Pulse 98   Temp 98.2 °F (36.8 °C)   Resp 17   Wt (!) 314 lb (142.4 kg)   SpO2 99%   BMI 43.79 kg/m²     Physical Exam  Vitals and nursing note reviewed. Constitutional:       General: He is not in acute distress. Appearance: He is not toxic-appearing. HENT:      Mouth/Throat:      Mouth: Mucous membranes are moist.   Eyes:      General: No scleral icterus. Extraocular Movements: Extraocular movements intact. Pupils: Pupils are equal, round, and reactive to light. Cardiovascular:      Rate and Rhythm: Normal rate and regular rhythm. Pulses: Normal pulses. Heart sounds: Normal heart sounds. No murmur heard. Pulmonary:      Effort: Pulmonary effort is normal. No respiratory distress. Breath sounds: Normal breath sounds.  No wheezing or rales. Abdominal:      General: There is no distension. Palpations: Abdomen is soft. Tenderness: There is no abdominal tenderness. There is no guarding or rebound. Musculoskeletal:         General: No swelling. Normal range of motion. Cervical back: Normal range of motion and neck supple. No rigidity. No muscular tenderness. Comments: LUE 2+ radial/ulnar pulses. 5/5 SA/EF/WE/WF/EE/GS, SILT throughout, AIN/PIN/IO intact. Active shoulder abduction limited past 90 degrees, intact after passive abduction beyond 90 degrees. Skin:     General: Skin is warm and dry. Neurological:      Mental Status: He is alert and oriented to person, place, and time. Comments: Moves all extremities with appropriate strength. Sensation grossly intact in all extremities. -------------------------------------------------- RESULTS -------------------------------------------------  I have personally reviewed all laboratory and imaging results for this patient. Results are listed below. RADIOLOGY:  Interpreted personally and by Radiologist.  XR SHOULDER LEFT (MIN 2 VIEWS)   Final Result   Unremarkable left shoulder. ------------------------- NURSING NOTES AND VITALS REVIEWED ---------------------------   The nursing notes within the ED encounter and vital signs as below have been reviewed by myself. BP (!) 161/86   Pulse 98   Temp 98.2 °F (36.8 °C)   Resp 17   Wt (!) 314 lb (142.4 kg)   SpO2 99%   BMI 43.79 kg/m²   Oxygen Saturation Interpretation: Normal    The patients available past medical records and past encounters were reviewed. ------------------------------ ED COURSE/MEDICAL DECISION MAKING----------------------  Medications   ketorolac (TORADOL) injection 30 mg (30 mg IntraMUSCular Given 1/24/23 0856)     Independent interpretation of tests:  XR SHOULDER LEFT - no acute fracture or dislocation      Counseling:    The emergency provider has spoken with the patient and discussed todays results, in addition to providing specific details for the plan of care and counseling regarding the diagnosis and prognosis. Questions are answered at this time and they are agreeable with the plan. ED Course/Medical Decision Makin y.o. male here with persistent left shoulder s/p fall 3 weeks ago after slipping on ice. Non-toxic appearing, afebrile, hemodynamically stable, and in no acute distress. LUE neurovascularly intact. Plain films negative for acute process. On reevaluation after treatment with IM Toradol, patient reports significant improvement in his symptoms. After discussion of findings and return precautions, patient agrees with plan for discharge and outpatient follow up with PCP. Rx ibuprofen. Already has sling as needed for comfort.       --------------------------------- IMPRESSION AND DISPOSITION ---------------------------------    IMPRESSION  1. Acute pain of left shoulder        DISPOSITION  Disposition: Discharge to home  Patient condition is good    NOTE: This report was transcribed using voice recognition software.  Every effort was made to ensure accuracy; however, inadvertent computerized transcription errors may be present    Ebonie Martinez MD  Attending Emergency Physician         Ebonie Martinez MD  23 [id number]

## 2023-02-24 ENCOUNTER — OFFICE VISIT (OUTPATIENT)
Dept: FAMILY MEDICINE CLINIC | Age: 23
End: 2023-02-24

## 2023-02-24 VITALS
RESPIRATION RATE: 18 BRPM | OXYGEN SATURATION: 98 % | WEIGHT: 315 LBS | TEMPERATURE: 97.1 F | DIASTOLIC BLOOD PRESSURE: 82 MMHG | HEART RATE: 88 BPM | HEIGHT: 71 IN | BODY MASS INDEX: 44.1 KG/M2 | SYSTOLIC BLOOD PRESSURE: 130 MMHG

## 2023-02-24 DIAGNOSIS — E11.65 TYPE 2 DIABETES MELLITUS WITH HYPERGLYCEMIA, WITH LONG-TERM CURRENT USE OF INSULIN (HCC): ICD-10-CM

## 2023-02-24 DIAGNOSIS — Z79.4 TYPE 2 DIABETES MELLITUS WITH HYPERGLYCEMIA, WITH LONG-TERM CURRENT USE OF INSULIN (HCC): ICD-10-CM

## 2023-02-24 DIAGNOSIS — M25.512 ACUTE PAIN OF LEFT SHOULDER: ICD-10-CM

## 2023-02-24 DIAGNOSIS — M99.01 SOMATIC DYSFUNCTION OF CERVICAL REGION: Primary | ICD-10-CM

## 2023-02-24 DIAGNOSIS — Z91.199 MEDICALLY NONCOMPLIANT: ICD-10-CM

## 2023-02-24 DIAGNOSIS — E11.65 UNCONTROLLED TYPE 2 DIABETES MELLITUS WITH HYPERGLYCEMIA (HCC): ICD-10-CM

## 2023-02-24 DIAGNOSIS — E11.9 TYPE 2 DIABETES MELLITUS WITHOUT COMPLICATION, WITHOUT LONG-TERM CURRENT USE OF INSULIN (HCC): ICD-10-CM

## 2023-02-24 DIAGNOSIS — M99.07 SOMATIC DYSFUNCTION OF UPPER EXTREMITY: ICD-10-CM

## 2023-02-24 LAB
CREATININE URINE POCT: NORMAL
HBA1C MFR BLD: 10.3 %
MICROALBUMIN/CREAT 24H UR: NORMAL MG/G{CREAT}
MICROALBUMIN/CREAT UR-RTO: NORMAL

## 2023-02-24 RX ORDER — FLASH GLUCOSE SENSOR
KIT MISCELLANEOUS
Qty: 2 EACH | Refills: 6 | Status: SHIPPED | OUTPATIENT
Start: 2023-02-24

## 2023-02-24 RX ORDER — IBUPROFEN 800 MG/1
TABLET ORAL
COMMUNITY
Start: 2023-01-16

## 2023-02-24 RX ORDER — INSULIN LISPRO 100 [IU]/ML
INJECTION, SOLUTION INTRAVENOUS; SUBCUTANEOUS
Qty: 12 ADJUSTABLE DOSE PRE-FILLED PEN SYRINGE | Refills: 3 | Status: SHIPPED | OUTPATIENT
Start: 2023-02-24

## 2023-02-24 SDOH — ECONOMIC STABILITY: HOUSING INSECURITY
IN THE LAST 12 MONTHS, WAS THERE A TIME WHEN YOU DID NOT HAVE A STEADY PLACE TO SLEEP OR SLEPT IN A SHELTER (INCLUDING NOW)?: NO

## 2023-02-24 SDOH — ECONOMIC STABILITY: FOOD INSECURITY: WITHIN THE PAST 12 MONTHS, THE FOOD YOU BOUGHT JUST DIDN'T LAST AND YOU DIDN'T HAVE MONEY TO GET MORE.: NEVER TRUE

## 2023-02-24 SDOH — ECONOMIC STABILITY: INCOME INSECURITY: HOW HARD IS IT FOR YOU TO PAY FOR THE VERY BASICS LIKE FOOD, HOUSING, MEDICAL CARE, AND HEATING?: NOT HARD AT ALL

## 2023-02-24 SDOH — ECONOMIC STABILITY: FOOD INSECURITY: WITHIN THE PAST 12 MONTHS, YOU WORRIED THAT YOUR FOOD WOULD RUN OUT BEFORE YOU GOT MONEY TO BUY MORE.: NEVER TRUE

## 2023-02-24 ASSESSMENT — PATIENT HEALTH QUESTIONNAIRE - PHQ9
SUM OF ALL RESPONSES TO PHQ9 QUESTIONS 1 & 2: 0
SUM OF ALL RESPONSES TO PHQ QUESTIONS 1-9: 0
SUM OF ALL RESPONSES TO PHQ QUESTIONS 1-9: 0
1. LITTLE INTEREST OR PLEASURE IN DOING THINGS: 0
2. FEELING DOWN, DEPRESSED OR HOPELESS: 0
SUM OF ALL RESPONSES TO PHQ QUESTIONS 1-9: 0
SUM OF ALL RESPONSES TO PHQ QUESTIONS 1-9: 0

## 2023-02-24 NOTE — PROGRESS NOTES
Roland Saha (:  2000) is a 23 y.o. male,Established patient, here for evaluation of the following chief complaint(s):  Follow-up (ED follow up./), Shoulder Pain, and Diabetes (Sees Endo 3/30/23)         ASSESSMENT/PLAN:  1. Somatic dysfunction of cervical region  -     External Referral To Chiropractic  2. Type 2 diabetes mellitus without complication, without long-term current use of insulin (HCC)  -     POCT glycosylated hemoglobin (Hb A1C)  -     POCT Microalbumin  3. Type 2 diabetes mellitus with hyperglycemia, with long-term current use of insulin (HCC)  -     POCT glycosylated hemoglobin (Hb A1C)  -     POCT Microalbumin  4. Uncontrolled type 2 diabetes mellitus with hyperglycemia (HCC)  -     POCT glycosylated hemoglobin (Hb A1C)  -     metFORMIN (GLUCOPHAGE) 500 MG tablet; Take 1 tablet by mouth 2 times daily (with meals), Disp-180 tablet, R-1Normal  -     Insulin Degludec 200 UNIT/ML SOPN; Inject 60 Units into the skin daily, Disp-12 Adjustable Dose Pre-filled Pen Syringe, R-3Normal  -     Continuous Blood Gluc Sensor (FREESTYLE SHE 2 SENSOR) MISC; Change sensor every 14 days, Disp-2 each, R-6Normal  -     insulin lispro, 1 Unit Dial, (HUMALOG KWIKPEN) 100 UNIT/ML SOPN; 20 units TID with meals plus mod dose ISS max dose per day 90 units, Disp-12 Adjustable Dose Pre-filled Pen Syringe, R-3Normal  -     POCT Microalbumin  5. Somatic dysfunction of upper extremity  -     External Referral To Chiropractic  6. Acute pain of left shoulder  -     External Referral To Chiropractic  7. Medically noncompliant      Return in about 6 months (around 2023) for annual adult well exam .         Subjective   SUBJECTIVE/OBJECTIVE:  HPI:    ED Follow up  \"Derek Melvern Claude is a 23 y.o. male presenting to the ED for left shoulder pain. Symptom onset 3 weeks ago after slipping on ice and falling onto the left arm.   Was seen at [facility] ED at that time, had negative plain films and discharged with instruction to follow-up with PCP to obtain an MRI. Patient has not yet followed up with his PCP. He was also prescribed ibuprofen at that time which she has not yet filled or taken. He is still having persistent pain in the left shoulder, occasionally radiates to the bicep, worse with movement. No associated numbness or weakness in the extremity. No neck pain. Unremarkable left shoulder. \"    Interval History:  -still feels pain in this left shoulder   -AROM has improved significantly but the pain is so significant after work that he feels \"like I went through a \"  -no numbness or tingling          Diabetes:    Medications:  -metformin 500mg BID with meals  -humalog AC ss plus mod dose ss, max 90 units per day  -Insulin Degludec 200 UNIT/ML SOPN, 60 units nightly     Last A1c 7.8%  A1c today 10.3%    No results found for: Acacia Vaughn  Getting updated micro today (not updated in system yet, <30)    Lab Results   Component Value Date    WBC 6.4 02/24/2022    HGB 15.0 02/24/2022    HCT 46.0 02/24/2022    MCV 81.6 02/24/2022     02/24/2022     Lab Results   Component Value Date     06/20/2022    K 4.5 06/20/2022     06/20/2022    CO2 20 (L) 06/20/2022    BUN 10 06/20/2022    CREATININE 1.0 06/20/2022    GLUCOSE 150 (H) 06/20/2022    CALCIUM 9.5 06/20/2022    PROT 7.8 06/20/2022    LABALBU 4.4 06/20/2022    BILITOT 0.3 06/20/2022    ALKPHOS 104 06/20/2022    AST 20 06/20/2022    ALT 29 06/20/2022    LABGLOM >60 06/20/2022    GFRAA >60 06/20/2022         Fasting castillo  230  Fasting average 250  Fasting zenith  260    (Glucometer and logs not available)    no microvascular complications (retinopathy, nephropathy, and neuropathy)  no macrovascular complications (high blood pressure, arterial stiffness, and kidney disease)    not counting/restricting carbohydrates, not listening to advice. Getting in excess of 300gm carbs per day. Not following low glycemic index.     Foot exam: not due  Eye exam: last exam was a year prior, no issues. 3/1/2023 with Christopher Terrycornelio is next. received diabetic education in the last calendar year. Preventative:  Health Maintenance   Topic Date Due    Diabetic foot exam  Never done    Lipids  01/05/2023    A1C test (Diabetic or Prediabetic)  05/24/2023    Depression Screen  06/20/2023    GFR test (Diabetes, CKD 3-4, OR last GFR 15-59)  06/20/2023    Diabetic Alb to Cr ratio (uACR) test  02/24/2024    Diabetic retinal exam  03/15/2024    DTaP/Tdap/Td vaccine (8 - Td or Tdap) 03/15/2032    Hepatitis A vaccine  Completed    Hepatitis B vaccine  Completed    Hib vaccine  Completed    HPV vaccine  Completed    Varicella vaccine  Completed    Flu vaccine  Completed    Pneumococcal 0-64 years Vaccine  Completed    COVID-19 Vaccine  Completed    Hepatitis C screen  Completed    HIV screen  Completed    Meningococcal (ACWY) vaccine  Aged Out           ROS:    Denies 10pt ROS other than noted in HPI. Objective       PHYSICAL EXAM:    /82   Pulse 88   Temp 97.1 °F (36.2 °C) (Temporal)   Resp 18   Ht 5' 11\" (1.803 m)   Wt (!) 324 lb 4.8 oz (147.1 kg)   SpO2 98%   BMI 45.23 kg/m²     AVSS    GA: Well-groomed, appears well, no acute distress. HEENT: Atraumatic normocephalic. Extraocular muscles are grossly intact. Pupils are equal round reactive to light. Conjunctiva pink and moist.  Hearing is grossly intact. Nares patent without external drainage. Buccal mucosa pink and moist.  Posterior oropharynx clear without lesion or exudate. NECK: Trachea is midline, supple, nontender, no lymphadenopathy. CARDIO: Regular rate and rhythm without murmur rub or gallop. Cap refill 2+. Radial pulses 2+ bilaterally. RESPIRATORY: Clear to auscultation bilaterally without wheezes rales or rhonchi. Normal inspiratory and expiratory effort. Normoxic on room air    ABD: obese, normoactive bowel sounds. MSK: Structurally appropriate for age.   No gross deficit. Neer and hawking negative, speed negative. Cross arm adductor is negative but reproduced pain in the shoulder (through rhomboids) - there is paravertebral hypertonicity within the rhomboids, lat, and trapezius on the left. There is left paravertebral hypertonicity in cervical and thoracic spine. There is decreased lumbar lordosis. NEURO: Alert, no gross deficit. PSYCH:  Mood is normal and congruent with affect. No signs of psychomotor retardation or agitation. Thought content seems normal, speech is fluent and non-pressured. SKIN: Generally warm pink and dry. An electronic signature was used to authenticate this note.     --Bettie Landa, DO

## 2023-02-24 NOTE — PATIENT INSTRUCTIONS
-Carbohydrates limit to 30 to 40g per meal (90g to 120g per day)  -Fats limit to 60g per day (total fat intake should be 30% to 35% of your total daily calories, so restrict to whichever number is lower)   -Of the fats you do eat, never eat trans fats, never eat unsaturated fats, limit your saturated fat intake to less than 20g per day (or 7% of your total daily calories, so restrict to whichever number is lower)  -Cholesterol limit to no more than 300mg per day (200mg per day if you have elevated triglycerides or total cholesterol)  -Sodium less than 2000mg per day    Oxynade or similar application (or track by journal) to monitor calories and macronutrients. This is the most critical component to a heart healthy, balanced diet: honesty, keeping oneself accountable, ensure you are tracking daily goals and meeting them. 2 servings fruit from the low glycemic column. 6 servings of veggies. Consider using service such as [employer] [never select keto, only select mediterranean or everything diets . .. enter the calorie goal above]    Food is medicine!

## 2023-03-05 ENCOUNTER — APPOINTMENT (OUTPATIENT)
Dept: GENERAL RADIOLOGY | Age: 23
End: 2023-03-05
Payer: COMMERCIAL

## 2023-03-05 ENCOUNTER — HOSPITAL ENCOUNTER (EMERGENCY)
Age: 23
Discharge: HOME | End: 2023-03-05
Attending: EMERGENCY MEDICINE
Payer: COMMERCIAL

## 2023-03-05 VITALS
TEMPERATURE: 97.5 F | HEIGHT: 71 IN | SYSTOLIC BLOOD PRESSURE: 135 MMHG | RESPIRATION RATE: 16 BRPM | HEART RATE: 70 BPM | DIASTOLIC BLOOD PRESSURE: 90 MMHG | BODY MASS INDEX: 44.1 KG/M2 | OXYGEN SATURATION: 99 % | WEIGHT: 315 LBS

## 2023-03-05 DIAGNOSIS — S80.02XA CONTUSION OF LEFT KNEE, INITIAL ENCOUNTER: Primary | ICD-10-CM

## 2023-03-05 PROCEDURE — 73560 X-RAY EXAM OF KNEE 1 OR 2: CPT

## 2023-03-05 PROCEDURE — 99283 EMERGENCY DEPT VISIT LOW MDM: CPT

## 2023-03-05 RX ORDER — IBUPROFEN 600 MG/1
600 TABLET ORAL EVERY 8 HOURS PRN
Qty: 30 TABLET | Refills: 0 | Status: SHIPPED | OUTPATIENT
Start: 2023-03-05 | End: 2023-03-15

## 2023-03-05 ASSESSMENT — ENCOUNTER SYMPTOMS
SORE THROAT: 0
EYE PAIN: 0
ABDOMINAL PAIN: 0
VOMITING: 0
SINUS PRESSURE: 0
DIARRHEA: 0
WHEEZING: 0
NAUSEA: 0
COUGH: 0
EYE DISCHARGE: 0
BACK PAIN: 0
EYE REDNESS: 0
SHORTNESS OF BREATH: 0

## 2023-03-05 ASSESSMENT — PAIN DESCRIPTION - LOCATION: LOCATION: KNEE

## 2023-03-05 ASSESSMENT — PAIN DESCRIPTION - PAIN TYPE: TYPE: ACUTE PAIN

## 2023-03-05 ASSESSMENT — PAIN - FUNCTIONAL ASSESSMENT
PAIN_FUNCTIONAL_ASSESSMENT: 0-10
PAIN_FUNCTIONAL_ASSESSMENT: 0-10

## 2023-03-05 ASSESSMENT — PAIN SCALES - GENERAL
PAINLEVEL_OUTOF10: 7
PAINLEVEL_OUTOF10: 7

## 2023-03-05 ASSESSMENT — PAIN DESCRIPTION - DESCRIPTORS: DESCRIPTORS: THROBBING

## 2023-03-05 ASSESSMENT — PAIN DESCRIPTION - FREQUENCY: FREQUENCY: CONTINUOUS

## 2023-03-05 ASSESSMENT — PAIN DESCRIPTION - ORIENTATION: ORIENTATION: LEFT

## 2023-03-05 NOTE — ED PROVIDER NOTES
The history is provided by the patient.   Knee Problem  Location:  Knee  Time since incident:  2 hours  Injury: yes    Mechanism of injury: fall    Fall:     Fall occurred:  Tripped and walking    Impact surface:  [place]  Knee location:  L knee  Pain details:     Quality:  Aching    Severity:  Moderate  Associated symptoms: no back pain and no fever       Review of Systems   Constitutional:  Negative for chills and fever.   HENT:  Negative for ear pain, sinus pressure and sore throat.    Eyes:  Negative for pain, discharge and redness.   Respiratory:  Negative for cough, shortness of breath and wheezing.    Cardiovascular:  Negative for chest pain.   Gastrointestinal:  Negative for abdominal pain, diarrhea, nausea and vomiting.   Genitourinary:  Negative for dysuria and frequency.   Musculoskeletal:  Negative for arthralgias and back pain.   Skin:  Negative for rash and wound.   Neurological:  Negative for weakness and headaches.   Hematological:  Negative for adenopathy.   All other systems reviewed and are negative.     Physical Exam  Vitals and nursing note reviewed.   Constitutional:       Appearance: He is well-developed.   HENT:      Head: Normocephalic and atraumatic.      Jaw: No trismus.      Right Ear: Hearing and external ear normal.      Left Ear: Hearing and external ear normal.      Nose: Nose normal.      Right Sinus: No maxillary sinus tenderness or frontal sinus tenderness.      Left Sinus: No maxillary sinus tenderness or frontal sinus tenderness.      Mouth/Throat:      Pharynx: Uvula midline. No uvula swelling.   Eyes:      General: Lids are normal.      Conjunctiva/sclera: Conjunctivae normal.      Pupils: Pupils are equal, round, and reactive to light.   Cardiovascular:      Rate and Rhythm: Normal rate and regular rhythm.      Heart sounds: Normal heart sounds. No murmur heard.  Pulmonary:      Effort: Pulmonary effort is normal. No respiratory distress.      Breath sounds: Normal breath  sounds. No wheezing or rales. Abdominal:      General: Bowel sounds are normal.      Palpations: Abdomen is soft. Abdomen is not rigid. Tenderness: There is no abdominal tenderness. There is no guarding or rebound. Musculoskeletal:      Cervical back: Normal range of motion and neck supple. Left knee: Swelling present. Decreased range of motion. Tenderness present. Skin:     General: Skin is warm and dry. Findings: No abrasion or rash. Neurological:      General: No focal deficit present. Mental Status: He is alert and oriented to person, place, and time. GCS: GCS eye subscore is 4. GCS verbal subscore is 5. GCS motor subscore is 6. Cranial Nerves: No cranial nerve deficit. Coordination: Coordination normal.        Procedures     MDM          --------------------------------------------- PAST HISTORY ---------------------------------------------  Past Medical History:  has a past medical history of Asthma, DKA (diabetic ketoacidoses), Hyperlipidemia, Hypertension, Obesity, Sinusitis, and Type 2 diabetes mellitus without complication ([facility] [address] Past Surgical History:  has no past surgical history on file. Social History:  reports that he quit smoking about 14 months ago. His smoking use included e-cigarettes and cigarettes. He has a 1.00 pack-year smoking history. He has never used smokeless tobacco. He reports current alcohol use. He reports current drug use. Drug: Marijuana Luz Elena Caban). Family History: family history includes Asthma in his mother; Blindness in his father; Coronary Art Dis in his father; Diabetes in his father and mother; Heart Attack in his father; Heart Failure in his father and paternal grandfather; High Blood Pressure in his father and mother; High Cholesterol in his mother; Other in his father. The patients home medications have been reviewed. Allergies: Patient has no known allergies.     -------------------------------------------------- RESULTS -------------------------------------------------  Labs:  No results found for this visit on 03/05/23. Radiology:  XR KNEE LEFT (1-2 VIEWS)   Final Result   Unremarkable left knee series. ------------------------- NURSING NOTES AND VITALS REVIEWED ---------------------------  Date / Time Roomed:  3/5/2023  4:03 PM  ED Bed Assignment:  01/01    The nursing notes within the ED encounter and vital signs as below have been reviewed. BP (!) 135/90   Pulse 70   Temp 97.5 °F (36.4 °C) (Temporal)   Resp 16   Ht 5' 11\" (1.803 m)   Wt (!) 325 lb (147.4 kg)   SpO2 99%   BMI 45.33 kg/m²   Oxygen Saturation Interpretation: Normal      ------------------------------------------ PROGRESS NOTES ------------------------------------------  I have spoken with the patient and discussed todays results, in addition to providing specific details for the plan of care and counseling regarding the diagnosis and prognosis. Their questions are answered at this time and they are agreeable with the plan. I discussed at length with them reasons for immediate return here for re evaluation. They will followup with primary care by calling their office tomorrow. Medications - No data to display      --------------------------------- ADDITIONAL PROVIDER NOTES ---------------------------------  At this time the patient is without objective evidence of an acute process requiring hospitalization or inpatient management. They have remained hemodynamically stable throughout their entire ED visit and are stable for discharge with outpatient follow-up. The plan has been discussed in detail and they are aware of the specific conditions for emergent return, as well as the importance of follow-up. New Prescriptions    IBUPROFEN (IBU) 600 MG TABLET    Take 1 tablet by mouth every 8 hours as needed for Pain       Diagnosis:  1.  Contusion of left knee, initial encounter        Disposition:  Patient's disposition: Discharge to home  Patient's condition is stable.                     Mehul Arellano MD  03/05/23 [id number]

## 2023-04-08 ENCOUNTER — APPOINTMENT (OUTPATIENT)
Dept: CT IMAGING | Age: 23
End: 2023-04-08
Payer: COMMERCIAL

## 2023-04-08 ENCOUNTER — HOSPITAL ENCOUNTER (EMERGENCY)
Age: 23
Discharge: HOME | End: 2023-04-08
Attending: FAMILY MEDICINE
Payer: COMMERCIAL

## 2023-04-08 VITALS
RESPIRATION RATE: 16 BRPM | OXYGEN SATURATION: 100 % | HEART RATE: 68 BPM | BODY MASS INDEX: 45.33 KG/M2 | WEIGHT: 315 LBS | DIASTOLIC BLOOD PRESSURE: 83 MMHG | TEMPERATURE: 96.9 F | SYSTOLIC BLOOD PRESSURE: 119 MMHG

## 2023-04-08 DIAGNOSIS — K21.9 GASTROESOPHAGEAL REFLUX DISEASE, UNSPECIFIED WHETHER ESOPHAGITIS PRESENT: Primary | ICD-10-CM

## 2023-04-08 DIAGNOSIS — R10.13 ABDOMINAL PAIN, EPIGASTRIC: ICD-10-CM

## 2023-04-08 LAB
BASOPHILS # BLD: 0.05 E9/L (ref 0–0.2)
BASOPHILS NFR BLD: 0.8 % (ref 0–2)
CO2: 25 MMOL/L (ref 22–29)
EOSINOPHIL # BLD: 0.17 E9/L (ref 0.05–0.5)
EOSINOPHIL NFR BLD: 2.6 % (ref 0–6)
ERYTHROCYTE [DISTWIDTH] IN BLOOD BY AUTOMATED COUNT: 13.4 FL (ref 11.5–15)
GLUCOSE BLD-MCNC: 257 MG/DL (ref 74–99)
HCT VFR BLD AUTO: 42.8 % (ref 37–54)
HGB BLD-MCNC: 14.2 G/DL (ref 12.5–16.5)
IMM GRANULOCYTES # BLD: 0.05 E9/L
IMM GRANULOCYTES NFR BLD: 0.8 % (ref 0–5)
LYMPHOCYTES # BLD: 2.75 E9/L (ref 1.5–4)
LYMPHOCYTES NFR BLD: 42.7 % (ref 20–42)
MCH RBC QN AUTO: 26.7 PG (ref 26–35)
MCHC RBC AUTO-ENTMCNC: 33.2 % (ref 32–34.5)
MCV RBC AUTO: 80.6 FL (ref 80–99.9)
MONOCYTES # BLD: 0.4 E9/L (ref 0.1–0.95)
MONOCYTES NFR BLD: 6.2 % (ref 2–12)
NEUTROPHILS # BLD: 3.02 E9/L (ref 1.8–7.3)
NEUTS SEG NFR BLD: 46.9 % (ref 43–80)
PERFORMED ON: ABNORMAL
PLATELET # BLD AUTO: 338 E9/L (ref 130–450)
PMV BLD AUTO: 10.4 FL (ref 7–12)
POC ANION GAP: 10 MMOL/L (ref 7–16)
POC BUN: 10 MG/DL (ref 8–23)
POC CHLORIDE: 106 MMOL/L (ref 100–108)
POC CREATININE: 1 MG/DL (ref 0.7–1.2)
POC SODIUM: 141 MMOL/L (ref 132–146)
POTASSIUM SERPL-SCNC: 4.7 MMOL/L (ref 3.5–5)
RBC # BLD AUTO: 5.31 E12/L (ref 3.8–5.8)
WBC # BLD: 6.4 E9/L (ref 4.5–11.5)

## 2023-04-08 PROCEDURE — 80051 ELECTROLYTE PANEL: CPT

## 2023-04-08 PROCEDURE — 6360000004 HC RX CONTRAST MEDICATION: Performed by: RADIOLOGY

## 2023-04-08 PROCEDURE — 6370000000 HC RX 637 (ALT 250 FOR IP): Performed by: FAMILY MEDICINE

## 2023-04-08 PROCEDURE — 82947 ASSAY GLUCOSE BLOOD QUANT: CPT

## 2023-04-08 PROCEDURE — 85025 COMPLETE CBC W/AUTO DIFF WBC: CPT

## 2023-04-08 PROCEDURE — 82565 ASSAY OF CREATININE: CPT

## 2023-04-08 PROCEDURE — 84520 ASSAY OF UREA NITROGEN: CPT

## 2023-04-08 PROCEDURE — 74177 CT ABD & PELVIS W/CONTRAST: CPT

## 2023-04-08 PROCEDURE — 36415 COLL VENOUS BLD VENIPUNCTURE: CPT

## 2023-04-08 RX ORDER — OMEPRAZOLE 20 MG/1
20 CAPSULE, DELAYED RELEASE ORAL EVERY 12 HOURS
Qty: 28 CAPSULE | Refills: 0 | Status: SHIPPED | OUTPATIENT
Start: 2023-04-08 | End: 2023-04-22

## 2023-04-08 RX ORDER — ONDANSETRON 4 MG/1
4 TABLET, ORALLY DISINTEGRATING ORAL ONCE
Status: COMPLETED | OUTPATIENT
Start: 2023-04-08 | End: 2023-04-08

## 2023-04-08 RX ORDER — ONDANSETRON 4 MG/1
4 TABLET, FILM COATED ORAL EVERY 4 HOURS
Qty: 5 TABLET | Refills: 0 | Status: SHIPPED | OUTPATIENT
Start: 2023-04-08

## 2023-04-08 RX ADMIN — ONDANSETRON 4 MG: 4 TABLET, ORALLY DISINTEGRATING ORAL at 12:22

## 2023-04-08 RX ADMIN — IOPAMIDOL 75 ML: 755 INJECTION, SOLUTION INTRAVENOUS at 13:04

## 2023-04-08 RX ADMIN — Medication: at 11:50

## 2023-04-08 ASSESSMENT — PAIN SCALES - GENERAL
PAINLEVEL_OUTOF10: 8

## 2023-04-08 ASSESSMENT — PAIN - FUNCTIONAL ASSESSMENT
PAIN_FUNCTIONAL_ASSESSMENT: 0-10

## 2023-04-08 ASSESSMENT — PAIN DESCRIPTION - LOCATION: LOCATION: ABDOMEN

## 2023-04-08 ASSESSMENT — PAIN DESCRIPTION - PAIN TYPE: TYPE: ACUTE PAIN

## 2023-04-08 ASSESSMENT — PAIN DESCRIPTION - DESCRIPTORS: DESCRIPTORS: BURNING;ACHING

## 2023-04-08 ASSESSMENT — PAIN DESCRIPTION - ONSET: ONSET: GRADUAL

## 2023-04-08 ASSESSMENT — PAIN DESCRIPTION - FREQUENCY: FREQUENCY: INTERMITTENT

## 2023-04-08 NOTE — Clinical Note
Laura Valdez was seen and treated in our emergency department on 4/8/2023. He may return to work on 04/10/2023. If you have any questions or concerns, please don't hesitate to call.       Harriet Flores MD

## 2023-04-08 NOTE — ED NOTES
CT Depart ment notified of order and to hold pt and phone result. Pt instructed NPO. T go directly to [facility] for out pt CT and await results. Discharge instructions reviewed. Pt verbalized understanding.      Chang Corbin RN  04/08/23 [id number]

## 2023-04-08 NOTE — ED PROVIDER NOTES
HPI:  4/8/23,   Time: 11:43 AM EDT         Manuel Molina is a 20 y.o. male presenting to the ED for gradual onset yesterday of upper/epigastric abdominal pain, associated with nausea and vomiting. He had 1 emesis again this morning. He did not eat or drink anything today. The last thing he ate yesterday was a pop tart. Prior to that the day before he ate a delgado melt. There is no one else ill at home. He denies any sick contacts at work. 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 He admits to occasionally having acid reflux/regurgitation that comes up into the back of his throat when he at night is lying down. He has never been diagnosed with GERD and does not take a proton pump inhibitor. ROS:   Pertinent positives and negatives are stated within HPI, all other systems reviewed and are negative.  --------------------------------------------- PAST HISTORY ---------------------------------------------  Past Medical History:  has a past medical history of Asthma, DKA (diabetic ketoacidoses), Hyperlipidemia, Hypertension, Obesity, Sinusitis, and Type 2 diabetes mellitus without complication ([facility] [address] Past Surgical History:  has no past surgical history on file. Social History:  reports that he quit smoking about 15 months ago. His smoking use included e-cigarettes and cigarettes. He has a 1.00 pack-year smoking history. He has never used smokeless tobacco. He reports current alcohol use. He reports current drug use. Drug: Marijuana Pierce Shoemaker). Family History: family history includes Asthma in his mother; Blindness in his father; Coronary Art Dis in his father; Diabetes in his father and mother; Heart Attack in his father; Heart Failure in his father and paternal grandfather; High Blood Pressure in his father and mother; High Cholesterol in his mother; Other in his father. The patients home medications have been reviewed.     Allergies: Patient has no known

## 2023-06-04 ENCOUNTER — HOSPITAL ENCOUNTER (EMERGENCY)
Age: 23
Discharge: HOME | End: 2023-06-04
Payer: COMMERCIAL

## 2023-06-04 ENCOUNTER — APPOINTMENT (OUTPATIENT)
Dept: GENERAL RADIOLOGY | Age: 23
End: 2023-06-04
Payer: COMMERCIAL

## 2023-06-04 VITALS
HEART RATE: 64 BPM | DIASTOLIC BLOOD PRESSURE: 90 MMHG | SYSTOLIC BLOOD PRESSURE: 145 MMHG | TEMPERATURE: 97.5 F | OXYGEN SATURATION: 99 % | RESPIRATION RATE: 18 BRPM

## 2023-06-04 DIAGNOSIS — S83.401A SPRAIN OF COLLATERAL LIGAMENT OF RIGHT KNEE, INITIAL ENCOUNTER: Primary | ICD-10-CM

## 2023-06-04 PROCEDURE — 6370000000 HC RX 637 (ALT 250 FOR IP): Performed by: PHYSICIAN ASSISTANT

## 2023-06-04 PROCEDURE — 73562 X-RAY EXAM OF KNEE 3: CPT

## 2023-06-04 PROCEDURE — 99283 EMERGENCY DEPT VISIT LOW MDM: CPT

## 2023-06-04 PROCEDURE — 73560 X-RAY EXAM OF KNEE 1 OR 2: CPT

## 2023-06-04 RX ORDER — NAPROXEN 500 MG/1
500 TABLET ORAL ONCE
Status: COMPLETED | OUTPATIENT
Start: 2023-06-04 | End: 2023-06-04

## 2023-06-04 RX ORDER — NAPROXEN 500 MG/1
500 TABLET ORAL 2 TIMES DAILY WITH MEALS
Qty: 10 TABLET | Refills: 0 | Status: SHIPPED | OUTPATIENT
Start: 2023-06-04 | End: 2023-06-09

## 2023-06-04 RX ADMIN — NAPROXEN 500 MG: 500 TABLET ORAL at 16:22

## 2023-06-04 ASSESSMENT — PAIN SCALES - GENERAL
PAINLEVEL_OUTOF10: 10
PAINLEVEL_OUTOF10: 10

## 2023-06-04 ASSESSMENT — PAIN - FUNCTIONAL ASSESSMENT: PAIN_FUNCTIONAL_ASSESSMENT: 0-10

## 2023-09-16 ENCOUNTER — HOSPITAL ENCOUNTER (EMERGENCY)
Age: 23
Discharge: HOME | End: 2023-09-16
Attending: FAMILY MEDICINE
Payer: COMMERCIAL

## 2023-09-16 ENCOUNTER — APPOINTMENT (OUTPATIENT)
Dept: CT IMAGING | Age: 23
End: 2023-09-16
Payer: COMMERCIAL

## 2023-09-16 VITALS
DIASTOLIC BLOOD PRESSURE: 82 MMHG | OXYGEN SATURATION: 97 % | HEIGHT: 71 IN | HEART RATE: 70 BPM | TEMPERATURE: 99 F | RESPIRATION RATE: 16 BRPM | SYSTOLIC BLOOD PRESSURE: 124 MMHG | BODY MASS INDEX: 43.4 KG/M2 | WEIGHT: 310 LBS

## 2023-09-16 DIAGNOSIS — R10.13 DYSPEPSIA: ICD-10-CM

## 2023-09-16 DIAGNOSIS — K29.00 ACUTE GASTRITIS WITHOUT HEMORRHAGE, UNSPECIFIED GASTRITIS TYPE: Primary | ICD-10-CM

## 2023-09-16 LAB
BASOPHILS # BLD: 0.07 K/UL (ref 0–0.2)
BASOPHILS NFR BLD: 1 % (ref 0–2)
BUN BLD-MCNC: 8 MG/DL (ref 6–20)
CHLORIDE BLD-SCNC: 106 MMOL/L (ref 100–108)
CO2 BLD CALC-SCNC: 27 MMOL/L (ref 22–29)
CREAT BLD-MCNC: 0.9 MG/DL (ref 0.7–1.2)
EGFR, POC: >60 ML/MIN/1.73M2
EOSINOPHIL # BLD: 0.2 K/UL (ref 0.05–0.5)
EOSINOPHILS RELATIVE PERCENT: 3 % (ref 0–6)
ERYTHROCYTE [DISTWIDTH] IN BLOOD BY AUTOMATED COUNT: 13.9 % (ref 12–16)
GLUCOSE BLD-MCNC: 93 MG/DL (ref 74–99)
HCT VFR BLD AUTO: 42.6 % (ref 37–54)
HGB BLD-MCNC: 14.5 G/DL (ref 12.5–16.5)
IMM GRANULOCYTES # BLD AUTO: <0.03 K/UL (ref 0–0.58)
IMM GRANULOCYTES NFR BLD: 0 % (ref 0–5)
LYMPHOCYTES NFR BLD: 2.74 K/UL (ref 1.5–4)
LYMPHOCYTES RELATIVE PERCENT: 40 % (ref 20–42)
MCH RBC QN AUTO: 27.4 PG (ref 26–35)
MCHC RBC AUTO-ENTMCNC: 34 G/DL (ref 32–34.5)
MCV RBC AUTO: 80.5 FL (ref 80–99.9)
MONOCYTES NFR BLD: 0.45 K/UL (ref 0.1–0.95)
MONOCYTES NFR BLD: 7 % (ref 2–12)
NEUTROPHILS NFR BLD: 50 % (ref 43–80)
NEUTS SEG NFR BLD: 3.41 K/UL (ref 1.8–7.3)
PLATELET # BLD AUTO: 343 K/UL (ref 130–450)
PMV BLD AUTO: 10.6 FL (ref 7–12)
POC ANION GAP: 7 MMOL/L (ref 7–16)
POTASSIUM BLD-SCNC: 4.1 MMOL/L (ref 3.5–5)
RBC # BLD AUTO: 5.29 M/UL (ref 3.8–5.8)
SODIUM BLD-SCNC: 140 MMOL/L (ref 132–146)
WBC OTHER # BLD: 6.9 K/UL (ref 4.5–11.5)

## 2023-09-16 PROCEDURE — 85025 COMPLETE CBC W/AUTO DIFF WBC: CPT

## 2023-09-16 PROCEDURE — 74177 CT ABD & PELVIS W/CONTRAST: CPT

## 2023-09-16 PROCEDURE — 6360000004 HC RX CONTRAST MEDICATION: Performed by: RADIOLOGY

## 2023-09-16 PROCEDURE — 82565 ASSAY OF CREATININE: CPT

## 2023-09-16 PROCEDURE — 36415 COLL VENOUS BLD VENIPUNCTURE: CPT

## 2023-09-16 PROCEDURE — 99285 EMERGENCY DEPT VISIT HI MDM: CPT

## 2023-09-16 PROCEDURE — 80051 ELECTROLYTE PANEL: CPT

## 2023-09-16 PROCEDURE — 82947 ASSAY GLUCOSE BLOOD QUANT: CPT

## 2023-09-16 PROCEDURE — 84520 ASSAY OF UREA NITROGEN: CPT

## 2023-09-16 RX ORDER — OMEPRAZOLE 20 MG/1
20 CAPSULE, DELAYED RELEASE ORAL EVERY 12 HOURS
Qty: 28 CAPSULE | Refills: 0 | Status: SHIPPED | OUTPATIENT
Start: 2023-09-16 | End: 2023-09-30

## 2023-09-16 RX ADMIN — IOPAMIDOL 75 ML: 755 INJECTION, SOLUTION INTRAVENOUS at 16:42

## 2023-09-16 ASSESSMENT — PAIN - FUNCTIONAL ASSESSMENT: PAIN_FUNCTIONAL_ASSESSMENT: 0-10

## 2023-09-16 ASSESSMENT — PAIN DESCRIPTION - PAIN TYPE: TYPE: ACUTE PAIN

## 2023-09-16 ASSESSMENT — PAIN DESCRIPTION - LOCATION: LOCATION: ABDOMEN

## 2023-09-16 ASSESSMENT — PAIN DESCRIPTION - DESCRIPTORS: DESCRIPTORS: CRAMPING

## 2023-09-16 ASSESSMENT — PAIN SCALES - GENERAL: PAINLEVEL_OUTOF10: 5

## 2023-09-16 ASSESSMENT — PAIN DESCRIPTION - FREQUENCY: FREQUENCY: CONTINUOUS

## 2023-09-16 NOTE — ED NOTES
CT result  called to Dr. Raffy Mckeon. Patient did not wait in CT for test results. Patient was given discharge papers and a work excuse prior to leaving [place].      Johnny Murphy RN  09/16/23 [id number] Alexis Lucio RN  09/16/23 0240

## 2023-09-29 NOTE — TELEPHONE ENCOUNTER
Patient called stating he tested Positive for Covid today at work. He is C/O sinus and chest congestion and coughing he is asking for Rx. Please advise.     Last seen 3/15/2022  Next appt 6/15/2022      Raad Evangelista NULL

## 2023-11-12 ENCOUNTER — APPOINTMENT (OUTPATIENT)
Dept: GENERAL RADIOLOGY | Age: 23
End: 2023-11-12
Payer: COMMERCIAL

## 2023-11-12 ENCOUNTER — HOSPITAL ENCOUNTER (EMERGENCY)
Age: 23
Discharge: HOME | End: 2023-11-12
Payer: COMMERCIAL

## 2023-11-12 VITALS
TEMPERATURE: 97.8 F | HEART RATE: 75 BPM | SYSTOLIC BLOOD PRESSURE: 155 MMHG | RESPIRATION RATE: 18 BRPM | DIASTOLIC BLOOD PRESSURE: 92 MMHG | WEIGHT: 315 LBS | BODY MASS INDEX: 45.33 KG/M2 | OXYGEN SATURATION: 99 %

## 2023-11-12 DIAGNOSIS — S46.912A STRAIN OF LEFT SHOULDER, INITIAL ENCOUNTER: Primary | ICD-10-CM

## 2023-11-12 PROCEDURE — 99283 EMERGENCY DEPT VISIT LOW MDM: CPT

## 2023-11-12 PROCEDURE — 73030 X-RAY EXAM OF SHOULDER: CPT

## 2023-11-12 RX ORDER — NAPROXEN 500 MG/1
500 TABLET ORAL 2 TIMES DAILY
Qty: 14 TABLET | Refills: 0 | Status: SHIPPED | OUTPATIENT
Start: 2023-11-12 | End: 2023-11-19

## 2023-11-12 ASSESSMENT — LIFESTYLE VARIABLES
HOW OFTEN DO YOU HAVE A DRINK CONTAINING ALCOHOL: NEVER
HOW MANY STANDARD DRINKS CONTAINING ALCOHOL DO YOU HAVE ON A TYPICAL DAY: PATIENT DOES NOT DRINK

## 2023-11-12 NOTE — ED NOTES
Left arm sling applied, home instructions given, verbalized understanding. Discharge instructions reviewed, patient verbalized understanding.      Kayla Villegas RN  11/12/23 [id number]

## 2023-11-26 ENCOUNTER — HOSPITAL ENCOUNTER (EMERGENCY)
Age: 23
Discharge: HOME | End: 2023-11-26
Attending: EMERGENCY MEDICINE
Payer: COMMERCIAL

## 2023-11-26 VITALS
WEIGHT: 315 LBS | BODY MASS INDEX: 45.33 KG/M2 | HEART RATE: 75 BPM | SYSTOLIC BLOOD PRESSURE: 148 MMHG | RESPIRATION RATE: 16 BRPM | TEMPERATURE: 97.8 F | DIASTOLIC BLOOD PRESSURE: 100 MMHG | OXYGEN SATURATION: 98 %

## 2023-11-26 DIAGNOSIS — B34.9 VIRAL SYNDROME: Primary | ICD-10-CM

## 2023-11-26 PROCEDURE — 99283 EMERGENCY DEPT VISIT LOW MDM: CPT

## 2023-11-26 RX ORDER — ONDANSETRON 4 MG/1
4 TABLET, FILM COATED ORAL
Qty: 10 TABLET | Refills: 0 | Status: SHIPPED | OUTPATIENT
Start: 2023-11-26

## 2023-11-26 ASSESSMENT — ENCOUNTER SYMPTOMS
VOMITING: 0
COUGH: 0
ABDOMINAL PAIN: 0
SORE THROAT: 0
DIARRHEA: 0
BACK PAIN: 0
EYE PAIN: 0
NAUSEA: 1
SINUS PRESSURE: 0
WHEEZING: 0
EYE DISCHARGE: 0
EYE REDNESS: 0
SHORTNESS OF BREATH: 0

## 2023-12-31 SDOH — HEALTH STABILITY: PHYSICAL HEALTH
ON AVERAGE, HOW MANY DAYS PER WEEK DO YOU ENGAGE IN MODERATE TO STRENUOUS EXERCISE (LIKE A BRISK WALK)?: PATIENT DECLINED

## 2023-12-31 SDOH — HEALTH STABILITY: PHYSICAL HEALTH: ON AVERAGE, HOW MANY MINUTES DO YOU ENGAGE IN EXERCISE AT THIS LEVEL?: 30 MIN

## 2024-01-02 ENCOUNTER — OFFICE VISIT (OUTPATIENT)
Dept: PRIMARY CARE CLINIC | Age: 24
End: 2024-01-02
Payer: COMMERCIAL

## 2024-01-02 VITALS
SYSTOLIC BLOOD PRESSURE: 151 MMHG | BODY MASS INDEX: 43.29 KG/M2 | DIASTOLIC BLOOD PRESSURE: 90 MMHG | HEIGHT: 71 IN | TEMPERATURE: 97.5 F | OXYGEN SATURATION: 100 % | RESPIRATION RATE: 16 BRPM | WEIGHT: 309.2 LBS | HEART RATE: 60 BPM

## 2024-01-02 DIAGNOSIS — G89.29 CHRONIC LEFT SHOULDER PAIN: ICD-10-CM

## 2024-01-02 DIAGNOSIS — M25.512 CHRONIC LEFT SHOULDER PAIN: ICD-10-CM

## 2024-01-02 DIAGNOSIS — Z79.4 TYPE 2 DIABETES MELLITUS WITH HYPERGLYCEMIA, WITH LONG-TERM CURRENT USE OF INSULIN (HCC): Primary | ICD-10-CM

## 2024-01-02 DIAGNOSIS — E78.2 MIXED HYPERLIPIDEMIA: ICD-10-CM

## 2024-01-02 DIAGNOSIS — E55.9 VITAMIN D DEFICIENCY: ICD-10-CM

## 2024-01-02 DIAGNOSIS — E11.65 TYPE 2 DIABETES MELLITUS WITH HYPERGLYCEMIA, WITH LONG-TERM CURRENT USE OF INSULIN (HCC): Primary | ICD-10-CM

## 2024-01-02 DIAGNOSIS — I10 PRIMARY HYPERTENSION: ICD-10-CM

## 2024-01-02 DIAGNOSIS — E11.65 UNCONTROLLED TYPE 2 DIABETES MELLITUS WITH HYPERGLYCEMIA (HCC): ICD-10-CM

## 2024-01-02 DIAGNOSIS — E66.01 CLASS 3 SEVERE OBESITY DUE TO EXCESS CALORIES WITHOUT SERIOUS COMORBIDITY WITH BODY MASS INDEX (BMI) OF 40.0 TO 44.9 IN ADULT (HCC): ICD-10-CM

## 2024-01-02 LAB
ALBUMIN SERPL-MCNC: 4.2 G/DL (ref 3.5–5.2)
ALP BLD-CCNC: 117 U/L (ref 40–129)
ALT SERPL-CCNC: 36 U/L (ref 0–40)
ANION GAP SERPL CALCULATED.3IONS-SCNC: 10 MMOL/L (ref 7–16)
AST SERPL-CCNC: 20 U/L (ref 0–39)
BILIRUB SERPL-MCNC: 0.2 MG/DL (ref 0–1.2)
BUN BLDV-MCNC: 13 MG/DL (ref 6–20)
CALCIUM SERPL-MCNC: 9.6 MG/DL (ref 8.6–10.2)
CHLORIDE BLD-SCNC: 103 MMOL/L (ref 98–107)
CHOLESTEROL: 128 MG/DL
CO2: 26 MMOL/L (ref 22–29)
CREAT SERPL-MCNC: 0.9 MG/DL (ref 0.7–1.2)
CREATININE URINE: 148.1 MG/DL (ref 40–278)
GFR SERPL CREATININE-BSD FRML MDRD: >60 ML/MIN/1.73M2
GLUCOSE BLD-MCNC: 143 MG/DL (ref 74–99)
HBA1C MFR BLD: 7.7 % (ref 4–5.6)
HCT VFR BLD CALC: 45.3 % (ref 37–54)
HDLC SERPL-MCNC: 32 MG/DL
HEMOGLOBIN: 15 G/DL (ref 12.5–16.5)
LDL CHOLESTEROL: 83 MG/DL
MCH RBC QN AUTO: 27.4 PG (ref 26–35)
MCHC RBC AUTO-ENTMCNC: 33.1 G/DL (ref 32–34.5)
MCV RBC AUTO: 82.7 FL (ref 80–99.9)
MICROALBUMIN/CREAT 24H UR: <12 MG/L (ref 0–19)
MICROALBUMIN/CREAT UR-RTO: NORMAL MCG/MG CREAT (ref 0–30)
PDW BLD-RTO: 13.7 % (ref 11.5–15)
PLATELET # BLD: 374 K/UL (ref 130–450)
PMV BLD AUTO: 10.7 FL (ref 7–12)
POTASSIUM SERPL-SCNC: 4.9 MMOL/L (ref 3.5–5)
RBC # BLD: 5.48 M/UL (ref 3.8–5.8)
SODIUM BLD-SCNC: 139 MMOL/L (ref 132–146)
TOTAL PROTEIN: 7.5 G/DL (ref 6.4–8.3)
TRIGL SERPL-MCNC: 66 MG/DL
VITAMIN D 25-HYDROXY: 11.3 NG/ML (ref 30–100)
VLDLC SERPL CALC-MCNC: 13 MG/DL
WBC # BLD: 8.5 K/UL (ref 4.5–11.5)

## 2024-01-02 PROCEDURE — G8484 FLU IMMUNIZE NO ADMIN: HCPCS | Performed by: FAMILY MEDICINE

## 2024-01-02 PROCEDURE — 3051F HG A1C>EQUAL 7.0%<8.0%: CPT | Performed by: FAMILY MEDICINE

## 2024-01-02 PROCEDURE — 99214 OFFICE O/P EST MOD 30 MIN: CPT | Performed by: FAMILY MEDICINE

## 2024-01-02 PROCEDURE — G8427 DOCREV CUR MEDS BY ELIG CLIN: HCPCS | Performed by: FAMILY MEDICINE

## 2024-01-02 PROCEDURE — 3077F SYST BP >= 140 MM HG: CPT | Performed by: FAMILY MEDICINE

## 2024-01-02 PROCEDURE — 36415 COLL VENOUS BLD VENIPUNCTURE: CPT | Performed by: FAMILY MEDICINE

## 2024-01-02 PROCEDURE — 1036F TOBACCO NON-USER: CPT | Performed by: FAMILY MEDICINE

## 2024-01-02 PROCEDURE — G8417 CALC BMI ABV UP PARAM F/U: HCPCS | Performed by: FAMILY MEDICINE

## 2024-01-02 PROCEDURE — 3080F DIAST BP >= 90 MM HG: CPT | Performed by: FAMILY MEDICINE

## 2024-01-02 PROCEDURE — 2022F DILAT RTA XM EVC RTNOPTHY: CPT | Performed by: FAMILY MEDICINE

## 2024-01-02 RX ORDER — LOSARTAN POTASSIUM 25 MG/1
25 TABLET ORAL DAILY
Qty: 90 TABLET | Refills: 1 | Status: SHIPPED | OUTPATIENT
Start: 2024-01-02

## 2024-01-02 RX ORDER — LANCETS 30 GAUGE
EACH MISCELLANEOUS
Qty: 300 EACH | Refills: 1 | Status: SHIPPED | OUTPATIENT
Start: 2024-01-02

## 2024-01-02 RX ORDER — UBIQUINOL 100 MG
CAPSULE ORAL
Qty: 300 EACH | Refills: 1 | Status: SHIPPED | OUTPATIENT
Start: 2024-01-02

## 2024-01-02 RX ORDER — INSULIN LISPRO 100 [IU]/ML
INJECTION, SOLUTION INTRAVENOUS; SUBCUTANEOUS
Qty: 12 ADJUSTABLE DOSE PRE-FILLED PEN SYRINGE | Refills: 3 | Status: SHIPPED | OUTPATIENT
Start: 2024-01-02

## 2024-01-02 ASSESSMENT — PATIENT HEALTH QUESTIONNAIRE - PHQ9
SUM OF ALL RESPONSES TO PHQ QUESTIONS 1-9: 0
SUM OF ALL RESPONSES TO PHQ QUESTIONS 1-9: 0
1. LITTLE INTEREST OR PLEASURE IN DOING THINGS: 0
SUM OF ALL RESPONSES TO PHQ9 QUESTIONS 1 & 2: 0
SUM OF ALL RESPONSES TO PHQ QUESTIONS 1-9: 0
SUM OF ALL RESPONSES TO PHQ QUESTIONS 1-9: 0
2. FEELING DOWN, DEPRESSED OR HOPELESS: 0

## 2024-01-02 NOTE — PROGRESS NOTES
Rudi Juarez (:  2000) is a 23 y.o. male, Established patient, here for evaluation of the following chief complaint(s):  New Patient (Patient here to establish care.)       ASSESSMENT/PLAN:  1. Type 2 diabetes mellitus with hyperglycemia, with long-term current use of insulin (HCC)  -      DIABETES FOOT EXAM  -     CBC  -     Comprehensive Metabolic Panel  -     Hemoglobin A1C  -     Lipid Panel  -     Microalbumin, Ur  -     NEW  empagliflozin (JARDIANCE) 10 MG tablet; Take 1 tablet by mouth daily, Disp-90 tablet, R-1Normal  -     blood glucose test strips (ASCENSIA AUTODISC VI;ONE TOUCH ULTRA TEST VI) strip; Disp-300 each, R-1, NormalCheck blood glucose TID Relion ONE  -     Alcohol Swabs (ALCOHOL PREP) 70 % PADS; Disp-300 each, R-1, NormalCheck blood sugar TID. E11.9, Z79.4. Brand per insurance  -     Lancets MISC; Disp-300 each, R-1, NormalCheck blood sugar TID. E11.9, Z79.4. Brand per insurance  2. Primary hypertension  -     NEW  losartan (COZAAR) 25 MG tablet; Take 1 tablet by mouth daily, Disp-90 tablet, R-1Normal  3. Uncontrolled type 2 diabetes mellitus with hyperglycemia (HCC)  -     Insulin Degludec 200 UNIT/ML SOPN; Inject 60 Units into the skin daily, Disp-12 Adjustable Dose Pre-filled Pen Syringe, R-3Normal  -     insulin lispro, 1 Unit Dial, (HUMALOG KWIKPEN) 100 UNIT/ML SOPN; 20 units TID with meals plus mod dose ISS max dose per day 90 units, Disp-12 Adjustable Dose Pre-filled Pen Syringe, R-3Normal  -     metFORMIN (GLUCOPHAGE) 500 MG tablet; Take 1 tablet by mouth 2 times daily (with meals), Disp-180 tablet, R-1Normal  4. Vitamin D deficiency  -     Vitamin D 25 Hydroxy  5. Chronic left shoulder pain  -     Master Gerard Jr., DO, [facility], [place]  6. Class 3 severe obesity due to excess calories without serious comorbidity with body mass index (BMI) of 40.0 to 44.9 in adult ([facility])  7. Mixed hyperlipidemia        Return in about 3 months (around

## 2024-01-05 RX ORDER — MULTIVIT-MIN/IRON/FOLIC ACID/K 18-600-40
1 CAPSULE ORAL DAILY
Qty: 90 CAPSULE | Refills: 1 | Status: SHIPPED | OUTPATIENT
Start: 2024-01-05

## 2024-01-05 RX ORDER — ERGOCALCIFEROL 1.25 MG/1
50000 CAPSULE ORAL WEEKLY
Qty: 4 CAPSULE | Refills: 2 | Status: SHIPPED | OUTPATIENT
Start: 2024-01-05

## 2024-01-05 NOTE — RESULT ENCOUNTER NOTE
A1c is down to 7.7. We ideally want it close to 6 if we can. We added the Jardiance, so we'll see in 3 months how well it is helping. Be sure to drink adequate water daily while taking. His vit D is quite low, so I'll restart him on the 50,000 weekly followed by taking 2,000 units daily for maintenance.

## 2024-04-16 ENCOUNTER — OFFICE VISIT (OUTPATIENT)
Dept: PRIMARY CARE CLINIC | Age: 24
End: 2024-04-16
Payer: COMMERCIAL

## 2024-04-16 VITALS
HEART RATE: 89 BPM | BODY MASS INDEX: 41.58 KG/M2 | OXYGEN SATURATION: 96 % | SYSTOLIC BLOOD PRESSURE: 133 MMHG | TEMPERATURE: 97.7 F | WEIGHT: 297 LBS | DIASTOLIC BLOOD PRESSURE: 87 MMHG | RESPIRATION RATE: 18 BRPM | HEIGHT: 71 IN

## 2024-04-16 DIAGNOSIS — E11.65 TYPE 2 DIABETES MELLITUS WITH HYPERGLYCEMIA, WITH LONG-TERM CURRENT USE OF INSULIN (HCC): Primary | ICD-10-CM

## 2024-04-16 DIAGNOSIS — J45.990 EXERCISE-INDUCED ASTHMA: ICD-10-CM

## 2024-04-16 DIAGNOSIS — Z79.4 TYPE 2 DIABETES MELLITUS WITH HYPERGLYCEMIA, WITH LONG-TERM CURRENT USE OF INSULIN (HCC): Primary | ICD-10-CM

## 2024-04-16 DIAGNOSIS — E66.01 CLASS 3 SEVERE OBESITY DUE TO EXCESS CALORIES WITHOUT SERIOUS COMORBIDITY WITH BODY MASS INDEX (BMI) OF 40.0 TO 44.9 IN ADULT (HCC): ICD-10-CM

## 2024-04-16 LAB — HBA1C MFR BLD: 6.9 %

## 2024-04-16 PROCEDURE — G8427 DOCREV CUR MEDS BY ELIG CLIN: HCPCS | Performed by: FAMILY MEDICINE

## 2024-04-16 PROCEDURE — 1036F TOBACCO NON-USER: CPT | Performed by: FAMILY MEDICINE

## 2024-04-16 PROCEDURE — 99214 OFFICE O/P EST MOD 30 MIN: CPT | Performed by: FAMILY MEDICINE

## 2024-04-16 PROCEDURE — 3044F HG A1C LEVEL LT 7.0%: CPT | Performed by: FAMILY MEDICINE

## 2024-04-16 PROCEDURE — 2022F DILAT RTA XM EVC RTNOPTHY: CPT | Performed by: FAMILY MEDICINE

## 2024-04-16 PROCEDURE — G2211 COMPLEX E/M VISIT ADD ON: HCPCS | Performed by: FAMILY MEDICINE

## 2024-04-16 PROCEDURE — G8417 CALC BMI ABV UP PARAM F/U: HCPCS | Performed by: FAMILY MEDICINE

## 2024-04-16 PROCEDURE — 83036 HEMOGLOBIN GLYCOSYLATED A1C: CPT | Performed by: FAMILY MEDICINE

## 2024-04-16 RX ORDER — SERTRALINE HYDROCHLORIDE 25 MG/1
25 TABLET, FILM COATED ORAL DAILY
Qty: 90 TABLET | Refills: 1 | Status: SHIPPED | OUTPATIENT
Start: 2024-04-16

## 2024-04-16 SDOH — ECONOMIC STABILITY: FOOD INSECURITY: WITHIN THE PAST 12 MONTHS, THE FOOD YOU BOUGHT JUST DIDN'T LAST AND YOU DIDN'T HAVE MONEY TO GET MORE.: NEVER TRUE

## 2024-04-16 SDOH — ECONOMIC STABILITY: INCOME INSECURITY: HOW HARD IS IT FOR YOU TO PAY FOR THE VERY BASICS LIKE FOOD, HOUSING, MEDICAL CARE, AND HEATING?: NOT HARD AT ALL

## 2024-04-16 SDOH — ECONOMIC STABILITY: FOOD INSECURITY: WITHIN THE PAST 12 MONTHS, YOU WORRIED THAT YOUR FOOD WOULD RUN OUT BEFORE YOU GOT MONEY TO BUY MORE.: NEVER TRUE

## 2024-04-16 NOTE — PROGRESS NOTES
Rudi Juarez  : 2000    Chief Complaint:     Chief Complaint   Patient presents with    Check-Up     Check A1C. Muscle pain due to sports. Does see an eye doctor. Diabetic retinal exam criselda. With Dr.Bloomberg      HPI  DM2 follow up. Doing well. Metformin, jardiance, humalog. Has been working on weight loss - 28 lb since November.   Hemoglobin A1C   Date Value Ref Range Status   2024 6.9 % Final   No concerns with asthma. Exercise/activity induced only.     Past medical, surgical, family and social histories reviewed and updated today as appropriate    Health Maintenance Due   Topic Date Due    Diabetic retinal exam  03/15/2023    Pneumococcal 0-64 years Vaccine (2 of 2 - PCV) 03/15/2023    COVID-19 Vaccine (5 - -24 season) 2023       Current Outpatient Medications   Medication Sig Dispense Refill    sertraline (ZOLOFT) 25 MG tablet Take 1 tablet by mouth daily 90 tablet 1    diclofenac sodium (VOLTAREN) 1 % GEL Apply 2 g topically in the morning, at noon, and at bedtime 200 g 1    ergocalciferol (DRISDOL) 1.25 MG (49072 UT) capsule Take 1 capsule by mouth once a week 4 capsule 2    Cholecalciferol (VITAMIN D) 50 MCG (2000 UT) CAPS capsule Take 1 capsule by mouth daily AFTER 50,000 script is complete 90 capsule 1    empagliflozin (JARDIANCE) 10 MG tablet Take 1 tablet by mouth daily 90 tablet 1    losartan (COZAAR) 25 MG tablet Take 1 tablet by mouth daily 90 tablet 1    blood glucose test strips (ASCENSIA AUTODISC VI;ONE TOUCH ULTRA TEST VI) strip Check blood glucose TID Relion  each 1    Insulin Degludec 200 UNIT/ML SOPN Inject 60 Units into the skin daily 12 Adjustable Dose Pre-filled Pen Syringe 3    insulin lispro, 1 Unit Dial, (HUMALOG KWIKPEN) 100 UNIT/ML SOPN 20 units TID with meals plus mod dose ISS max dose per day 90 units 12 Adjustable Dose Pre-filled Pen Syringe 3    metFORMIN (GLUCOPHAGE) 500 MG tablet Take 1 tablet by mouth 2 times daily (with meals) 180 tablet 1

## 2024-04-29 ASSESSMENT — ENCOUNTER SYMPTOMS
COUGH: 0
CHEST TIGHTNESS: 0
DIARRHEA: 0
WHEEZING: 0
SHORTNESS OF BREATH: 0
COLOR CHANGE: 0
VOMITING: 0
CONSTIPATION: 0
BLOOD IN STOOL: 0
ABDOMINAL PAIN: 0

## 2024-05-31 ENCOUNTER — OFFICE VISIT (OUTPATIENT)
Dept: PRIMARY CARE CLINIC | Age: 24
End: 2024-05-31
Payer: COMMERCIAL

## 2024-05-31 VITALS
HEIGHT: 71 IN | WEIGHT: 306.2 LBS | DIASTOLIC BLOOD PRESSURE: 80 MMHG | HEART RATE: 71 BPM | BODY MASS INDEX: 42.87 KG/M2 | TEMPERATURE: 97.7 F | OXYGEN SATURATION: 98 % | RESPIRATION RATE: 16 BRPM | SYSTOLIC BLOOD PRESSURE: 124 MMHG

## 2024-05-31 DIAGNOSIS — E11.65 TYPE 2 DIABETES MELLITUS WITH HYPERGLYCEMIA, WITH LONG-TERM CURRENT USE OF INSULIN (HCC): ICD-10-CM

## 2024-05-31 DIAGNOSIS — Z00.00 PREVENTATIVE HEALTH CARE: Primary | ICD-10-CM

## 2024-05-31 DIAGNOSIS — E55.9 VITAMIN D DEFICIENCY: ICD-10-CM

## 2024-05-31 DIAGNOSIS — M25.512 CHRONIC LEFT SHOULDER PAIN: ICD-10-CM

## 2024-05-31 DIAGNOSIS — G89.29 CHRONIC LEFT SHOULDER PAIN: ICD-10-CM

## 2024-05-31 DIAGNOSIS — E66.01 CLASS 3 SEVERE OBESITY DUE TO EXCESS CALORIES WITHOUT SERIOUS COMORBIDITY WITH BODY MASS INDEX (BMI) OF 40.0 TO 44.9 IN ADULT (HCC): ICD-10-CM

## 2024-05-31 DIAGNOSIS — Z79.4 TYPE 2 DIABETES MELLITUS WITH HYPERGLYCEMIA, WITH LONG-TERM CURRENT USE OF INSULIN (HCC): ICD-10-CM

## 2024-05-31 DIAGNOSIS — Z00.01 ENCOUNTER FOR WELL ADULT EXAM WITH ABNORMAL FINDINGS: ICD-10-CM

## 2024-05-31 DIAGNOSIS — E78.2 MIXED HYPERLIPIDEMIA: ICD-10-CM

## 2024-05-31 LAB
ALBUMIN: 4.1 G/DL (ref 3.5–5.2)
ALP BLD-CCNC: 154 U/L (ref 40–129)
ALT SERPL-CCNC: 29 U/L (ref 0–40)
ANION GAP SERPL CALCULATED.3IONS-SCNC: 14 MMOL/L (ref 7–16)
AST SERPL-CCNC: 20 U/L (ref 0–39)
BILIRUB SERPL-MCNC: 0.2 MG/DL (ref 0–1.2)
BUN BLDV-MCNC: 11 MG/DL (ref 6–20)
CALCIUM SERPL-MCNC: 9.3 MG/DL (ref 8.6–10.2)
CHLORIDE BLD-SCNC: 102 MMOL/L (ref 98–107)
CHOLESTEROL, TOTAL: 131 MG/DL
CO2: 23 MMOL/L (ref 22–29)
CREAT SERPL-MCNC: 1 MG/DL (ref 0.7–1.2)
CREATININE URINE: 310.2 MG/DL (ref 40–278)
GFR, ESTIMATED: >90 ML/MIN/1.73M2
GLUCOSE BLD-MCNC: 212 MG/DL (ref 74–99)
HBA1C MFR BLD: 7.7 %
HCT VFR BLD CALC: 44.1 % (ref 37–54)
HDLC SERPL-MCNC: 38 MG/DL
HEMOGLOBIN: 14.2 G/DL (ref 12.5–16.5)
LDL CHOLESTEROL: 64 MG/DL
MCH RBC QN AUTO: 27.1 PG (ref 26–35)
MCHC RBC AUTO-ENTMCNC: 32.2 G/DL (ref 32–34.5)
MCV RBC AUTO: 84.2 FL (ref 80–99.9)
MICROALBUMIN/CREAT 24H UR: <12 MG/L (ref 0–19)
MICROALBUMIN/CREAT UR-RTO: ABNORMAL MCG/MG CREAT (ref 0–30)
PDW BLD-RTO: 13.5 % (ref 11.5–15)
PLATELET # BLD: 330 K/UL (ref 130–450)
PMV BLD AUTO: 10.8 FL (ref 7–12)
POTASSIUM SERPL-SCNC: 5.1 MMOL/L (ref 3.5–5)
RBC # BLD: 5.24 M/UL (ref 3.8–5.8)
SODIUM BLD-SCNC: 139 MMOL/L (ref 132–146)
TOTAL PROTEIN: 7.2 G/DL (ref 6.4–8.3)
TRIGL SERPL-MCNC: 146 MG/DL
VITAMIN D 25-HYDROXY: 14.1 NG/ML (ref 30–100)
VLDLC SERPL CALC-MCNC: 29 MG/DL
WBC # BLD: 7.1 K/UL (ref 4.5–11.5)

## 2024-05-31 PROCEDURE — 83036 HEMOGLOBIN GLYCOSYLATED A1C: CPT | Performed by: FAMILY MEDICINE

## 2024-05-31 PROCEDURE — 99395 PREV VISIT EST AGE 18-39: CPT | Performed by: FAMILY MEDICINE

## 2024-05-31 RX ORDER — MULTIVIT-MIN/IRON/FOLIC ACID/K 18-600-40
1 CAPSULE ORAL DAILY
Qty: 90 CAPSULE | Refills: 1 | Status: SHIPPED
Start: 2024-05-31 | End: 2024-06-06

## 2024-05-31 NOTE — PROGRESS NOTES
[facility]  5. Vitamin D deficiency  -     Vitamin D 25 Hydroxy  6. Class 3 severe obesity due to excess calories without serious comorbidity with body mass index (BMI) of 40.0 to 44.9 in adult (HCC)  The patient is asked to make an attempt to improve diet and exercise patterns to aid in medical management of this problem.   7. Mixed hyperlipidemia      Counseling/Anticipatory Guidance provided on the following: Nutrition, physical activity, healthy weight, injury prevention, misuse of tobacco, alcohol and drugs, sexual behavior and STDs, contraception, dental health, mental health, immunizations, screenings:  Health Maintenance Due   Topic Date Due    Diabetic retinal exam  03/15/2023    Pneumococcal 0-64 years Vaccine (2 of 2 - PCV) 03/15/2023    COVID-19 Vaccine (5 - 2023-24 season) 09/01/2023         Reviewed age and gender appropriate health screening exams and vaccinations.  Advised patient regarding importance of keeping up with recommended health maintenance and to schedule as soon as possible if overdue, as this is important in assessing for undiagnosed pathology, especially cancer.  Patient verbalizes understanding and agrees.      Call or go to ED immediately if symptoms worsen or persist.  Return in about 6 months (around 11/30/2024). Sooner if necessary.      Counseled regarding above diagnosis(es), including possible risks and complications, especially if left uncontrolled.  Counseled regarding the possible side effects, risks, benefits and alternatives to treatment; patient and/or guardian verbalizes understanding. Advised patient to call with any new medication issues. All questions answered.    Tony Johnson MD  5/31/24

## 2024-06-24 ASSESSMENT — ENCOUNTER SYMPTOMS
BLOOD IN STOOL: 0
SHORTNESS OF BREATH: 0
COLOR CHANGE: 0
ABDOMINAL PAIN: 0
VOMITING: 0
EYE REDNESS: 0
CHEST TIGHTNESS: 0
COUGH: 0
CONSTIPATION: 0
DIARRHEA: 0
WHEEZING: 0

## 2024-07-23 ENCOUNTER — HOSPITAL ENCOUNTER (EMERGENCY)
Age: 24
Discharge: HOME | End: 2024-07-23
Payer: COMMERCIAL

## 2024-07-23 VITALS
TEMPERATURE: 97.8 F | DIASTOLIC BLOOD PRESSURE: 83 MMHG | SYSTOLIC BLOOD PRESSURE: 147 MMHG | RESPIRATION RATE: 18 BRPM | HEART RATE: 85 BPM | OXYGEN SATURATION: 97 %

## 2024-07-23 DIAGNOSIS — R73.9 HYPERGLYCEMIA: Primary | ICD-10-CM

## 2024-07-23 LAB
ALBUMIN SERPL-MCNC: 4.1 G/DL (ref 3.5–5.2)
ALP SERPL-CCNC: 172 U/L (ref 40–129)
ALT SERPL-CCNC: 27 U/L (ref 0–40)
ANION GAP SERPL CALCULATED.3IONS-SCNC: 12 MMOL/L (ref 7–16)
AST SERPL-CCNC: 12 U/L (ref 0–39)
B-OH-BUTYR SERPL-MCNC: 0.1 MMOL/L (ref 0.02–0.27)
BASOPHILS # BLD: 0.05 K/UL (ref 0–0.2)
BASOPHILS NFR BLD: 1 % (ref 0–2)
BILIRUB SERPL-MCNC: 0.3 MG/DL (ref 0–1.2)
BILIRUB UR QL STRIP: NEGATIVE
BUN SERPL-MCNC: 14 MG/DL (ref 6–20)
CALCIUM SERPL-MCNC: 9.1 MG/DL (ref 8.6–10.2)
CHLORIDE SERPL-SCNC: 99 MMOL/L (ref 98–107)
CLARITY UR: CLEAR
CO2 SERPL-SCNC: 23 MMOL/L (ref 22–29)
COLOR UR: YELLOW
CREAT SERPL-MCNC: 1 MG/DL (ref 0.7–1.2)
EOSINOPHIL # BLD: 0.12 K/UL (ref 0.05–0.5)
EOSINOPHILS RELATIVE PERCENT: 2 % (ref 0–6)
ERYTHROCYTE [DISTWIDTH] IN BLOOD BY AUTOMATED COUNT: 12.9 % (ref 11.5–15)
GFR, ESTIMATED: >90 ML/MIN/1.73M2
GLUCOSE BLD-MCNC: 131 MG/DL (ref 74–99)
GLUCOSE SERPL-MCNC: 455 MG/DL (ref 74–99)
GLUCOSE UR STRIP-MCNC: >=1000 MG/DL
HCT VFR BLD AUTO: 44.6 % (ref 37–54)
HGB BLD-MCNC: 15 G/DL (ref 12.5–16.5)
HGB UR QL STRIP.AUTO: NEGATIVE
IMM GRANULOCYTES # BLD AUTO: 0.03 K/UL (ref 0–0.58)
IMM GRANULOCYTES NFR BLD: 1 % (ref 0–5)
KETONES UR STRIP-MCNC: NEGATIVE MG/DL
LEUKOCYTE ESTERASE UR QL STRIP: NEGATIVE
LYMPHOCYTES NFR BLD: 2.6 K/UL (ref 1.5–4)
LYMPHOCYTES RELATIVE PERCENT: 43 % (ref 20–42)
MCH RBC QN AUTO: 27.1 PG (ref 26–35)
MCHC RBC AUTO-ENTMCNC: 33.6 G/DL (ref 32–34.5)
MCV RBC AUTO: 80.7 FL (ref 80–99.9)
MONOCYTES NFR BLD: 0.31 K/UL (ref 0.1–0.95)
MONOCYTES NFR BLD: 5 % (ref 2–12)
NEUTROPHILS NFR BLD: 49 % (ref 43–80)
NEUTS SEG NFR BLD: 2.94 K/UL (ref 1.8–7.3)
NITRITE UR QL STRIP: NEGATIVE
PH UR STRIP: 6 [PH] (ref 5–9)
PH VENOUS: 7.38 (ref 7.35–7.45)
PLATELET # BLD AUTO: 319 K/UL (ref 130–450)
PMV BLD AUTO: 11.2 FL (ref 7–12)
POTASSIUM SERPL-SCNC: 4.6 MMOL/L (ref 3.5–5)
PROT SERPL-MCNC: 7.8 G/DL (ref 6.4–8.3)
PROT UR STRIP-MCNC: NEGATIVE MG/DL
RBC # BLD AUTO: 5.53 M/UL (ref 3.8–5.8)
RBC #/AREA URNS HPF: ABNORMAL /HPF
SODIUM SERPL-SCNC: 134 MMOL/L (ref 132–146)
SP GR UR STRIP: 1.01 (ref 1–1.03)
UROBILINOGEN UR STRIP-ACNC: 0.2 EU/DL (ref 0–1)
WBC #/AREA URNS HPF: ABNORMAL /HPF
WBC OTHER # BLD: 6.1 K/UL (ref 4.5–11.5)

## 2024-07-23 PROCEDURE — 96374 THER/PROPH/DIAG INJ IV PUSH: CPT

## 2024-07-23 PROCEDURE — 82800 BLOOD PH: CPT

## 2024-07-23 PROCEDURE — 82962 GLUCOSE BLOOD TEST: CPT

## 2024-07-23 PROCEDURE — 6370000000 HC RX 637 (ALT 250 FOR IP): Performed by: PHYSICIAN ASSISTANT

## 2024-07-23 PROCEDURE — 96361 HYDRATE IV INFUSION ADD-ON: CPT

## 2024-07-23 PROCEDURE — 80053 COMPREHEN METABOLIC PANEL: CPT

## 2024-07-23 PROCEDURE — 82010 KETONE BODYS QUAN: CPT

## 2024-07-23 PROCEDURE — 99284 EMERGENCY DEPT VISIT MOD MDM: CPT

## 2024-07-23 PROCEDURE — 85025 COMPLETE CBC W/AUTO DIFF WBC: CPT

## 2024-07-23 PROCEDURE — 81001 URINALYSIS AUTO W/SCOPE: CPT

## 2024-07-23 PROCEDURE — 2580000003 HC RX 258: Performed by: PHYSICIAN ASSISTANT

## 2024-07-23 RX ORDER — 0.9 % SODIUM CHLORIDE 0.9 %
1000 INTRAVENOUS SOLUTION INTRAVENOUS ONCE
Status: COMPLETED | OUTPATIENT
Start: 2024-07-23 | End: 2024-07-23

## 2024-07-23 RX ADMIN — SODIUM CHLORIDE 1000 ML: 9 INJECTION, SOLUTION INTRAVENOUS at 11:36

## 2024-07-23 RX ADMIN — INSULIN HUMAN 5 UNITS: 100 INJECTION, SOLUTION PARENTERAL at 14:22

## 2024-07-23 NOTE — ED PROVIDER NOTES
Independent CINTHYA Visit.          HPI:  7/23/24, Time: 10:53 AM EDT         Rudi Juarez is a 24 y.o. male presenting to the ED for hyperglycemia, beginning 1 day ago.  The complaint has been persistent, moderate in severity, and worsened by nothing. Blood sugar was 433 while at work today. Did not take insulin last night or this morning however he did take it yesterday morning.  Patient reports that he has plenty of insulin at home and just did not have it on him which is why he did not take it.  Denies any abdominal pain, back pain, chest pain, shortness of breath, vomiting, diarrhea, headache, dizziness, lightheadedness.  Afebrile without recent travel or sick contacts.  Does report a little bit of nausea.  Otherwise patient is asymptomatic.  Denies all other symptoms at this time.    Review of Systems:   A complete review of systems was performed and pertinent positives and negatives are stated within HPI, all other systems reviewed and are negative.          --------------------------------------------- PAST HISTORY ---------------------------------------------  Past Medical History:  has a past medical history of Asthma, DKA (diabetic ketoacidoses), Hyperlipidemia, Hypertension, Obesity, Sinusitis, and Type 2 diabetes mellitus without complication (HCC).    Past Surgical History:  has no past surgical history on file.    Social History:  reports that he quit smoking about 2 years ago. His smoking use included e-cigarettes and cigarettes. He started smoking about 6 years ago. He has a 1.0 pack-year smoking history. He has never used smokeless tobacco. He reports current alcohol use. He reports that he does not currently use drugs after having used the following drugs: Marijuana (Weed).    Family History: family history includes Asthma in his mother; Blindness in his father; Coronary Art Dis in his father; Diabetes in his father and mother; Heart Attack in his father; Heart Failure in his father and paternal

## 2024-08-14 ENCOUNTER — HOSPITAL ENCOUNTER (EMERGENCY)
Age: 24
Discharge: HOME | End: 2024-08-14
Attending: EMERGENCY MEDICINE
Payer: COMMERCIAL

## 2024-08-14 VITALS
HEART RATE: 68 BPM | OXYGEN SATURATION: 99 % | HEIGHT: 71 IN | SYSTOLIC BLOOD PRESSURE: 125 MMHG | RESPIRATION RATE: 18 BRPM | TEMPERATURE: 97.8 F | WEIGHT: 298 LBS | BODY MASS INDEX: 41.72 KG/M2 | DIASTOLIC BLOOD PRESSURE: 74 MMHG

## 2024-08-14 DIAGNOSIS — M25.512 LEFT SHOULDER PAIN, UNSPECIFIED CHRONICITY: ICD-10-CM

## 2024-08-14 DIAGNOSIS — M54.12 CERVICAL RADICULOPATHY: Primary | ICD-10-CM

## 2024-08-14 PROCEDURE — 6360000002 HC RX W HCPCS: Performed by: EMERGENCY MEDICINE

## 2024-08-14 PROCEDURE — 96372 THER/PROPH/DIAG INJ SC/IM: CPT

## 2024-08-14 PROCEDURE — 99284 EMERGENCY DEPT VISIT MOD MDM: CPT

## 2024-08-14 PROCEDURE — 6370000000 HC RX 637 (ALT 250 FOR IP): Performed by: EMERGENCY MEDICINE

## 2024-08-14 RX ORDER — IBUPROFEN 800 MG/1
800 TABLET ORAL EVERY 8 HOURS PRN
Qty: 21 TABLET | Refills: 0 | Status: SHIPPED | OUTPATIENT
Start: 2024-08-14 | End: 2024-08-21

## 2024-08-14 RX ORDER — METHOCARBAMOL 500 MG/1
750 TABLET, FILM COATED ORAL ONCE
Status: COMPLETED | OUTPATIENT
Start: 2024-08-14 | End: 2024-08-14

## 2024-08-14 RX ORDER — METHYLPREDNISOLONE 4 MG/1
TABLET ORAL
Qty: 1 KIT | Refills: 0 | Status: SHIPPED | OUTPATIENT
Start: 2024-08-14 | End: 2024-08-14 | Stop reason: SINTOL

## 2024-08-14 RX ORDER — IBUPROFEN 800 MG/1
800 TABLET ORAL ONCE
Status: COMPLETED | OUTPATIENT
Start: 2024-08-14 | End: 2024-08-14

## 2024-08-14 RX ORDER — METHOCARBAMOL 750 MG/1
750 TABLET, FILM COATED ORAL 4 TIMES DAILY
Qty: 40 TABLET | Refills: 0 | Status: SHIPPED | OUTPATIENT
Start: 2024-08-14 | End: 2024-08-24

## 2024-08-14 RX ORDER — PREDNISONE 20 MG/1
60 TABLET ORAL ONCE
Status: DISCONTINUED | OUTPATIENT
Start: 2024-08-14 | End: 2024-08-14

## 2024-08-14 RX ORDER — TRIAMCINOLONE ACETONIDE 40 MG/ML
60 INJECTION, SUSPENSION INTRA-ARTICULAR; INTRAMUSCULAR ONCE
Status: COMPLETED | OUTPATIENT
Start: 2024-08-14 | End: 2024-08-14

## 2024-08-14 RX ADMIN — METHOCARBAMOL 750 MG: 500 TABLET ORAL at 08:18

## 2024-08-14 RX ADMIN — TRIAMCINOLONE ACETONIDE 60 MG: 40 INJECTION, SUSPENSION INTRA-ARTICULAR; INTRAMUSCULAR at 08:18

## 2024-08-14 RX ADMIN — IBUPROFEN 800 MG: 800 TABLET, FILM COATED ORAL at 08:18

## 2024-08-14 ASSESSMENT — PAIN DESCRIPTION - PAIN TYPE: TYPE: ACUTE PAIN

## 2024-08-14 ASSESSMENT — PAIN DESCRIPTION - FREQUENCY: FREQUENCY: CONTINUOUS

## 2024-08-14 ASSESSMENT — PAIN DESCRIPTION - ORIENTATION: ORIENTATION: LEFT

## 2024-08-14 ASSESSMENT — PAIN SCALES - GENERAL: PAINLEVEL_OUTOF10: 8

## 2024-08-14 ASSESSMENT — PAIN DESCRIPTION - DESCRIPTORS: DESCRIPTORS: SHARP;NUMBNESS

## 2024-08-14 ASSESSMENT — PAIN DESCRIPTION - LOCATION: LOCATION: SHOULDER

## 2024-08-14 NOTE — ED PROVIDER NOTES
about 2 years ago. His smoking use included e-cigarettes and cigarettes. He started smoking about 6 years ago. He has a 1 pack-year smoking history. He has never used smokeless tobacco. He reports current alcohol use. He reports that he does not currently use drugs after having used the following drugs: Marijuana (Weed).    Family History: family history includes Asthma in his mother; Blindness in his father; Coronary Art Dis in his father; Diabetes in his father and mother; Heart Attack in his father; Heart Failure in his father and paternal grandfather; High Blood Pressure in his father and mother; High Cholesterol in his mother; Other in his father.     Allergies: Fish oil    Physical Exam   Oxygen Saturation Interpretation: Normal.        ED Triage Vitals   BP Systolic BP Percentile Diastolic BP Percentile Temp Temp Source Pulse Respirations SpO2   08/14/24 0739 -- -- 08/14/24 0648 08/14/24 0648 08/14/24 0648 08/14/24 0739 08/14/24 0648   125/74   97.8 °F (36.6 °C) Temporal 68 18 99 %      Height Weight - Scale         08/14/24 0739 08/14/24 0739         1.803 m (5' 11\") 135.2 kg (298 lb)               Constitutional:  Alert, development consistent with age.  Neck:  Normal ROM.  Supple. Non-tender.  Physical Exam  Left Shoulder: posterior.              Tenderness: mild              Swelling: None.            Deformity: no deformity observed/palpated.              ROM: full range of motion.             Skin:  no wounds, erythema, or swelling.        Neurovascular:              Motor deficit: none.             Sensory deficit: none.              Pulse deficit: none.              Capillary refill: normal.    Left Elbow: diffusely across elbow            Tenderness:  none.              Swelling: None.            Deformity: no deformity observed/palpated.             ROM: full painless ROM.             Skin:  no wounds, erythema, or swelling.  Lymphatics: No lymphangitis or edema noted  Neurological:  Oriented.

## 2024-12-17 ENCOUNTER — OFFICE VISIT (OUTPATIENT)
Dept: PRIMARY CARE CLINIC | Age: 24
End: 2024-12-17
Payer: COMMERCIAL

## 2024-12-17 VITALS
OXYGEN SATURATION: 98 % | HEART RATE: 56 BPM | DIASTOLIC BLOOD PRESSURE: 80 MMHG | WEIGHT: 295.4 LBS | BODY MASS INDEX: 41.35 KG/M2 | TEMPERATURE: 97.5 F | RESPIRATION RATE: 16 BRPM | SYSTOLIC BLOOD PRESSURE: 121 MMHG | HEIGHT: 71 IN

## 2024-12-17 DIAGNOSIS — E55.9 VITAMIN D DEFICIENCY: ICD-10-CM

## 2024-12-17 DIAGNOSIS — F41.9 ANXIETY: ICD-10-CM

## 2024-12-17 DIAGNOSIS — Z79.4 TYPE 2 DIABETES MELLITUS WITH HYPERGLYCEMIA, WITH LONG-TERM CURRENT USE OF INSULIN (HCC): Primary | ICD-10-CM

## 2024-12-17 DIAGNOSIS — E11.65 TYPE 2 DIABETES MELLITUS WITH HYPERGLYCEMIA, WITH LONG-TERM CURRENT USE OF INSULIN (HCC): Primary | ICD-10-CM

## 2024-12-17 DIAGNOSIS — J30.9 ALLERGIC RHINITIS, UNSPECIFIED SEASONALITY, UNSPECIFIED TRIGGER: ICD-10-CM

## 2024-12-17 DIAGNOSIS — J45.990 EXERCISE-INDUCED ASTHMA: ICD-10-CM

## 2024-12-17 DIAGNOSIS — E66.01 MORBID OBESITY: ICD-10-CM

## 2024-12-17 DIAGNOSIS — I10 PRIMARY HYPERTENSION: ICD-10-CM

## 2024-12-17 LAB
CREATININE URINE: 114.3 MG/DL (ref 40–278)
MICROALBUMIN/CREAT 24H UR: 40 MG/L (ref 0–19)
MICROALBUMIN/CREAT UR-RTO: 35 MCG/MG CREAT (ref 0–30)

## 2024-12-17 PROCEDURE — G8427 DOCREV CUR MEDS BY ELIG CLIN: HCPCS | Performed by: FAMILY MEDICINE

## 2024-12-17 PROCEDURE — 83036 HEMOGLOBIN GLYCOSYLATED A1C: CPT | Performed by: FAMILY MEDICINE

## 2024-12-17 PROCEDURE — G2211 COMPLEX E/M VISIT ADD ON: HCPCS | Performed by: FAMILY MEDICINE

## 2024-12-17 PROCEDURE — G8417 CALC BMI ABV UP PARAM F/U: HCPCS | Performed by: FAMILY MEDICINE

## 2024-12-17 PROCEDURE — 3074F SYST BP LT 130 MM HG: CPT | Performed by: FAMILY MEDICINE

## 2024-12-17 PROCEDURE — 2022F DILAT RTA XM EVC RTNOPTHY: CPT | Performed by: FAMILY MEDICINE

## 2024-12-17 PROCEDURE — G8484 FLU IMMUNIZE NO ADMIN: HCPCS | Performed by: FAMILY MEDICINE

## 2024-12-17 PROCEDURE — 99214 OFFICE O/P EST MOD 30 MIN: CPT | Performed by: FAMILY MEDICINE

## 2024-12-17 PROCEDURE — 3079F DIAST BP 80-89 MM HG: CPT | Performed by: FAMILY MEDICINE

## 2024-12-17 PROCEDURE — 1036F TOBACCO NON-USER: CPT | Performed by: FAMILY MEDICINE

## 2024-12-17 RX ORDER — UBIQUINOL 100 MG
CAPSULE ORAL
Qty: 300 EACH | Refills: 2 | Status: SHIPPED | OUTPATIENT
Start: 2024-12-17

## 2024-12-17 RX ORDER — LANCETS 30 GAUGE
EACH MISCELLANEOUS
Qty: 300 EACH | Refills: 2 | Status: SHIPPED | OUTPATIENT
Start: 2024-12-17

## 2024-12-17 RX ORDER — FLUTICASONE PROPIONATE 50 MCG
1 SPRAY, SUSPENSION (ML) NASAL 2 TIMES DAILY
Qty: 16 G | Refills: 3 | Status: SHIPPED | OUTPATIENT
Start: 2024-12-17

## 2024-12-17 RX ORDER — INSULIN LISPRO 100 [IU]/ML
INJECTION, SOLUTION INTRAVENOUS; SUBCUTANEOUS
Qty: 12 ADJUSTABLE DOSE PRE-FILLED PEN SYRINGE | Refills: 3 | Status: SHIPPED | OUTPATIENT
Start: 2024-12-17

## 2024-12-17 RX ORDER — LORATADINE 10 MG/1
10 TABLET ORAL DAILY
Qty: 90 TABLET | Refills: 2 | Status: SHIPPED | OUTPATIENT
Start: 2024-12-17

## 2024-12-17 RX ORDER — MULTIVIT-MIN/IRON/FOLIC ACID/K 18-600-40
2000 CAPSULE ORAL DAILY
Qty: 90 CAPSULE | Refills: 2 | Status: SHIPPED | OUTPATIENT
Start: 2024-12-17

## 2024-12-17 RX ORDER — LOSARTAN POTASSIUM 25 MG/1
25 TABLET ORAL DAILY
Qty: 90 TABLET | Refills: 2 | Status: SHIPPED | OUTPATIENT
Start: 2024-12-17

## 2024-12-17 RX ORDER — LIRAGLUTIDE 6 MG/ML
0.6 INJECTION SUBCUTANEOUS DAILY
Qty: 3 ML | Refills: 3 | Status: SHIPPED | OUTPATIENT
Start: 2024-12-17

## 2024-12-17 RX ORDER — BLOOD-GLUCOSE METER
1 KIT MISCELLANEOUS
Qty: 1 KIT | Refills: 0 | Status: SHIPPED | OUTPATIENT
Start: 2024-12-17

## 2024-12-17 NOTE — PROGRESS NOTES
Rudi Juarez  : 2000    Chief Complaint:     Chief Complaint   Patient presents with    Follow-up     6 month follow up.     HPI  DM2 follow up. Doing well. Metformin, jardiance, Tresiba, humalog. Has been working on weight loss - 28 lb since November.   Hemoglobin A1C   Date Value Ref Range Status   2024 7.7 % Final   No concerns with asthma. Exercise/activity induced only.   HTN controlled, taking meds as rx. Denies symptoms high bp including HA, vision changes, CP, SOB, edema, focal neuro deficits. No symptoms low bp.     Past medical, surgical, family and social histories reviewed and updated today as appropriate    Health Maintenance Due   Topic Date Due    Diabetic retinal exam  03/15/2023    Pneumococcal 0-64 years Vaccine (2 of 2 - PCV) 03/15/2023    Flu vaccine (1) 2024    COVID-19 Vaccine (5 - -24 season) 2024    Diabetic foot exam  2025    Depression Screen  2025       Current Outpatient Medications   Medication Sig Dispense Refill    vitamin D 50 MCG (2000) CAPS capsule Take 1 capsule by mouth daily 90 capsule 2    fluticasone (FLONASE) 50 MCG/ACT nasal spray 1 spray by Each Nostril route in the morning and at bedtime 16 g 3    loratadine (CLARITIN) 10 MG tablet Take 1 tablet by mouth daily 90 tablet 2    empagliflozin (JARDIANCE) 25 MG tablet Take 1 tablet by mouth daily 90 tablet 2    sertraline (ZOLOFT) 50 MG tablet Take 1 tablet by mouth daily 90 tablet 2    Alcohol Swabs (ALCOHOL PREP) 70 % PADS Check blood sugar TID. E11.9, Z79.4. Brand per insurance 300 each 2    blood glucose test strips (ASCENSIA AUTODISC VI;ONE TOUCH ULTRA TEST VI) strip Check blood glucose TID Relion  each 2    Insulin Degludec 200 UNIT/ML SOPN Inject 60 Units into the skin daily 12 Adjustable Dose Pre-filled Pen Syringe 3    insulin lispro, 1 Unit Dial, (HUMALOG KWIKPEN) 100 UNIT/ML SOPN 20 units TID with meals plus mod dose ISS max dose per day 90 units 12 Adjustable

## 2025-01-05 ENCOUNTER — HOSPITAL ENCOUNTER (EMERGENCY)
Age: 25
Discharge: HOME | End: 2025-01-05
Attending: EMERGENCY MEDICINE
Payer: COMMERCIAL

## 2025-01-05 VITALS
RESPIRATION RATE: 16 BRPM | TEMPERATURE: 97.2 F | HEART RATE: 67 BPM | DIASTOLIC BLOOD PRESSURE: 90 MMHG | SYSTOLIC BLOOD PRESSURE: 140 MMHG | OXYGEN SATURATION: 100 %

## 2025-01-05 DIAGNOSIS — M62.830 BACK SPASM: Primary | ICD-10-CM

## 2025-01-05 PROCEDURE — 99283 EMERGENCY DEPT VISIT LOW MDM: CPT

## 2025-01-05 RX ORDER — CYCLOBENZAPRINE HCL 10 MG
10 TABLET ORAL 3 TIMES DAILY PRN
Qty: 15 TABLET | Refills: 0 | Status: SHIPPED | OUTPATIENT
Start: 2025-01-05 | End: 2025-01-10

## 2025-01-05 ASSESSMENT — PAIN - FUNCTIONAL ASSESSMENT
PAIN_FUNCTIONAL_ASSESSMENT: PREVENTS OR INTERFERES SOME ACTIVE ACTIVITIES AND ADLS
PAIN_FUNCTIONAL_ASSESSMENT: 0-10

## 2025-01-05 ASSESSMENT — PAIN DESCRIPTION - DESCRIPTORS: DESCRIPTORS: DISCOMFORT;TIGHTNESS

## 2025-01-05 ASSESSMENT — PAIN DESCRIPTION - FREQUENCY: FREQUENCY: INTERMITTENT

## 2025-01-05 ASSESSMENT — PAIN DESCRIPTION - LOCATION: LOCATION: BACK

## 2025-01-05 ASSESSMENT — PAIN DESCRIPTION - PAIN TYPE: TYPE: ACUTE PAIN

## 2025-01-05 ASSESSMENT — PAIN SCALES - GENERAL: PAINLEVEL_OUTOF10: 7

## 2025-01-05 NOTE — ED PROVIDER NOTES
reviewed.    Allergies: Fish oil    -------------------------------------------------- RESULTS -------------------------------------------------  Labs:  No results found for this visit on 01/05/25.    Radiology:  No orders to display       ------------------------- NURSING NOTES AND VITALS REVIEWED ---------------------------  Date / Time Roomed:  1/5/2025  2:22 PM  ED Bed Assignment:  02/02    The nursing notes within the ED encounter and vital signs as below have been reviewed.   BP (!) 140/90   Pulse 67   Temp 97.2 °F (36.2 °C) (Temporal)   Resp 16   SpO2 100%   Oxygen Saturation Interpretation: Normal      ------------------------------------------ PROGRESS NOTES ------------------------------------------  I have spoken with the patient and discussed today’s results, in addition to providing specific details for the plan of care and counseling regarding the diagnosis and prognosis.  Their questions are answered at this time and they are agreeable with the plan. I discussed at length with them reasons for immediate return here for re evaluation. They will followup with primary care by calling their office tomorrow.    Medications - No data to display      --------------------------------- ADDITIONAL PROVIDER NOTES ---------------------------------  At this time the patient is without objective evidence of an acute process requiring hospitalization or inpatient management.  They have remained hemodynamically stable throughout their entire ED visit and are stable for discharge with outpatient follow-up.     The plan has been discussed in detail and they are aware of the specific conditions for emergent return, as well as the importance of follow-up.      New Prescriptions    CYCLOBENZAPRINE (FLEXERIL) 10 MG TABLET    Take 1 tablet by mouth 3 times daily as needed for Muscle spasms       Diagnosis:  1. Back spasm        Disposition:  Patient's disposition: Discharge to home  Patient's condition is

## 2025-01-06 PROBLEM — I10 PRIMARY HYPERTENSION: Status: ACTIVE | Noted: 2025-01-06

## 2025-01-23 ENCOUNTER — HOSPITAL ENCOUNTER (EMERGENCY)
Age: 25
Discharge: HOME | End: 2025-01-23
Attending: FAMILY MEDICINE
Payer: COMMERCIAL

## 2025-01-23 VITALS
SYSTOLIC BLOOD PRESSURE: 139 MMHG | HEIGHT: 71 IN | DIASTOLIC BLOOD PRESSURE: 95 MMHG | OXYGEN SATURATION: 99 % | WEIGHT: 295 LBS | HEART RATE: 80 BPM | TEMPERATURE: 97.9 F | BODY MASS INDEX: 41.3 KG/M2 | RESPIRATION RATE: 18 BRPM

## 2025-01-23 DIAGNOSIS — K52.9 GASTROENTERITIS: Primary | ICD-10-CM

## 2025-01-23 PROCEDURE — 99283 EMERGENCY DEPT VISIT LOW MDM: CPT

## 2025-01-23 RX ORDER — ONDANSETRON 4 MG/1
4 TABLET, ORALLY DISINTEGRATING ORAL 3 TIMES DAILY PRN
Qty: 5 TABLET | Refills: 0 | Status: SHIPPED | OUTPATIENT
Start: 2025-01-23

## 2025-01-23 ASSESSMENT — PAIN - FUNCTIONAL ASSESSMENT
PAIN_FUNCTIONAL_ASSESSMENT: NONE - DENIES PAIN
PAIN_FUNCTIONAL_ASSESSMENT: NONE - DENIES PAIN

## 2025-01-23 NOTE — ED PROVIDER NOTES
HPI:  1/23/25,   Time: 1:04 PM IRINEO Juarez is a 24 y.o. male presenting to the ED for nausea vomit diarrhea started about 3 days ago.  He is last the loose stool was about 24 hours ago and he has had no further diarrhea.  He still has some nausea, his last emesis was more like a regurgitation at this morning.  The last food that he ate was a chicken noodle soup out of a can.  He did have some bodyaches leading up to this.  He denies chills.  He denied upper respiratory symptoms.    ROS:   Pertinent positives and negatives are stated within HPI, all other systems reviewed and are negative.  --------------------------------------------- PAST HISTORY ---------------------------------------------  Past Medical History:  has a past medical history of Asthma, DKA (diabetic ketoacidoses), Hyperlipidemia, Hypertension, Obesity, Sinusitis, and Type 2 diabetes mellitus without complication (HCC).    Past Surgical History:  has no past surgical history on file.    Social History:  reports that he quit smoking about 3 years ago. His smoking use included e-cigarettes and cigarettes. He started smoking about 7 years ago. He has a 1 pack-year smoking history. He has never used smokeless tobacco. He reports current alcohol use. He reports that he does not currently use drugs after having used the following drugs: Marijuana (Weed).    Family History: family history includes Asthma in his mother; Blindness in his father; Coronary Art Dis in his father; Diabetes in his father and mother; Heart Attack in his father; Heart Failure in his father and paternal grandfather; High Blood Pressure in his father and mother; High Cholesterol in his mother; Other in his father.     The patient’s home medications have been reviewed.    Allergies: Fish oil    -------------------------------------------------- RESULTS -------------------------------------------------  All laboratory and radiology results have been personally reviewed

## 2025-03-27 ENCOUNTER — HOSPITAL ENCOUNTER (EMERGENCY)
Age: 25
Discharge: HOME | End: 2025-03-27
Payer: COMMERCIAL

## 2025-03-27 ENCOUNTER — APPOINTMENT (OUTPATIENT)
Dept: CT IMAGING | Age: 25
End: 2025-03-27
Payer: COMMERCIAL

## 2025-03-27 VITALS
DIASTOLIC BLOOD PRESSURE: 85 MMHG | TEMPERATURE: 97 F | OXYGEN SATURATION: 98 % | HEART RATE: 67 BPM | SYSTOLIC BLOOD PRESSURE: 128 MMHG | RESPIRATION RATE: 18 BRPM

## 2025-03-27 DIAGNOSIS — H65.02 ACUTE SEROUS OTITIS MEDIA WITHOUT RUPTURE, LEFT: ICD-10-CM

## 2025-03-27 DIAGNOSIS — R42 LIGHTHEADEDNESS: ICD-10-CM

## 2025-03-27 DIAGNOSIS — R11.0 NAUSEA: Primary | ICD-10-CM

## 2025-03-27 DIAGNOSIS — R51.9 ACUTE NONINTRACTABLE HEADACHE, UNSPECIFIED HEADACHE TYPE: ICD-10-CM

## 2025-03-27 PROCEDURE — 6370000000 HC RX 637 (ALT 250 FOR IP): Performed by: PHYSICIAN ASSISTANT

## 2025-03-27 PROCEDURE — 99284 EMERGENCY DEPT VISIT MOD MDM: CPT

## 2025-03-27 PROCEDURE — 70450 CT HEAD/BRAIN W/O DYE: CPT

## 2025-03-27 RX ORDER — FLUTICASONE PROPIONATE 50 MCG
2 SPRAY, SUSPENSION (ML) NASAL DAILY
Qty: 16 G | Refills: 0 | Status: SHIPPED | OUTPATIENT
Start: 2025-03-27

## 2025-03-27 RX ORDER — ONDANSETRON 4 MG/1
4 TABLET, ORALLY DISINTEGRATING ORAL ONCE
Status: COMPLETED | OUTPATIENT
Start: 2025-03-27 | End: 2025-03-27

## 2025-03-27 RX ORDER — MECLIZINE HCL 12.5 MG 12.5 MG/1
25 TABLET ORAL ONCE
Status: COMPLETED | OUTPATIENT
Start: 2025-03-27 | End: 2025-03-27

## 2025-03-27 RX ADMIN — ONDANSETRON 4 MG: 4 TABLET, ORALLY DISINTEGRATING ORAL at 09:26

## 2025-03-27 RX ADMIN — MECLIZINE HYDROCHLORIDE 25 MG: 12.5 TABLET ORAL at 09:25

## 2025-03-27 NOTE — ED PROVIDER NOTES
Independent CINTHYA Visit.             [facility] EMERGENCY DEPARTMENT  ED  Encounter Note  Admit Date/RoomTime: 3/27/2025  8:53 AM  ED Room:   NAME: Rudi Juarez  : 2000  MRN: [id number]  PCP: Tony Johnson MD    CHIEF COMPLAINT     Nausea (With occasional dizziness x 2 days, reports it as a \"sinus dizziness\" that he first noticed at work yesterday)    HISTORY OF PRESENT ILLNESS        Rudi Juarez is a 24 y.o. male who presents to the ED by private vehicle for nausea, HA and lightheadedness, beginning a few day(s) ago. The complaint has been persistent and are mild in severity.  The patient states that there was no fall or trauma.  He describes just feeling a little bit off balance.  Left-sided headache reported.  No vomiting or confusion.  He has had some mild nausea.  Does report recent upper respiratory infection.  Denies weakness of the extremities.  No reported speech deficits.  He is a diabetic.  Sugars have been under pretty good control      REVIEW OF SYSTEMS     Pertinent positives and negatives are stated within HPI, all other systems reviewed and are negative.    Past Medical History:  has a past medical history of Asthma, DKA (diabetic ketoacidoses), Hyperlipidemia, Hypertension, Obesity, Sinusitis, and Type 2 diabetes mellitus without complication (HCC).  Surgical History:  has no past surgical history on file.  Social History:  reports that he quit smoking about 3 years ago. His smoking use included e-cigarettes and cigarettes. He started smoking about 7 years ago. He has a 1 pack-year smoking history. He has never used smokeless tobacco. He reports current alcohol use. He reports that he does not currently use drugs after having used the following drugs: Marijuana (Weed).  Family History: family history includes Asthma in his mother; Blindness in his father; Coronary Art Dis in his father; Diabetes in his father and mother; Heart Attack in his father; Heart  Failure in his father and paternal grandfather; High Blood Pressure in his father and mother; High Cholesterol in his mother; Other in his father.   Allergies: Fish oil  CURRENT MEDICATIONS       Previous Medications    ALCOHOL SWABS (ALCOHOL PREP) 70 % PADS    Check blood sugar TID. E11.9, Z79.4. Brand per insurance    BLOOD GLUCOSE MONITORING SUPPL (ONE TOUCH ULTRA MINI) W/DEVICE KIT    1 kit by Does not apply route 3 times daily (before meals)    BLOOD GLUCOSE TEST STRIPS (ASCENSIA AUTODISC VI;ONE TOUCH ULTRA TEST VI) STRIP    Check blood glucose TID Relion ONE    DICLOFENAC SODIUM (VOLTAREN) 1 % GEL    Apply 2 g topically in the morning, at noon, and at bedtime    EMPAGLIFLOZIN (JARDIANCE) 25 MG TABLET    Take 1 tablet by mouth daily    IBUPROFEN (IBU) 800 MG TABLET    Take 1 tablet by mouth every 8 hours as needed for Pain    INSULIN DEGLUDEC 200 UNIT/ML SOPN    Inject 60 Units into the skin daily    INSULIN LISPRO, 1 UNIT DIAL, (HUMALOG KWIKPEN) 100 UNIT/ML SOPN    20 units TID with meals plus mod dose ISS max dose per day 90 units    INSULIN PEN NEEDLE 32G X 4 MM MISC    1 each by Does not apply route 5 times daily . Insulin qid and victoza    LANCETS MISC    Check blood sugar TID. E11.9, Z79.4. Brand per insurance    LIRAGLUTIDE (VICTOZA) 18 MG/3ML SOPN SC INJECTION    Inject 0.6 mg into the skin daily    LORATADINE (CLARITIN) 10 MG TABLET    Take 1 tablet by mouth daily    LOSARTAN (COZAAR) 25 MG TABLET    Take 1 tablet by mouth daily    METFORMIN (GLUCOPHAGE) 500 MG TABLET    Take 1 tablet by mouth 2 times daily (with meals)    ONDANSETRON (ZOFRAN-ODT) 4 MG DISINTEGRATING TABLET    Take 1 tablet by mouth 3 times daily as needed for Nausea or Vomiting    SERTRALINE (ZOLOFT) 50 MG TABLET    Take 1 tablet by mouth daily    VITAMIN D 50 MCG (2000 UT) CAPS CAPSULE    Take 1 capsule by mouth daily       SCREENINGS     Veronica Coma Scale  Eye Opening: Spontaneous  Best Verbal Response: Oriented  Best Motor

## 2025-08-10 ENCOUNTER — HOSPITAL ENCOUNTER (EMERGENCY)
Age: 25
Discharge: HOME | End: 2025-08-10
Attending: EMERGENCY MEDICINE
Payer: COMMERCIAL

## 2025-08-10 VITALS
RESPIRATION RATE: 18 BRPM | TEMPERATURE: 97.7 F | SYSTOLIC BLOOD PRESSURE: 131 MMHG | OXYGEN SATURATION: 100 % | HEART RATE: 65 BPM | DIASTOLIC BLOOD PRESSURE: 85 MMHG

## 2025-08-10 DIAGNOSIS — M62.838 CERVICAL PARASPINAL MUSCLE SPASM: Primary | ICD-10-CM

## 2025-08-10 PROCEDURE — 99283 EMERGENCY DEPT VISIT LOW MDM: CPT

## 2025-08-10 RX ORDER — IBUPROFEN 600 MG/1
600 TABLET, FILM COATED ORAL EVERY 8 HOURS PRN
Qty: 30 TABLET | Refills: 0 | Status: SHIPPED | OUTPATIENT
Start: 2025-08-10 | End: 2025-08-20

## 2025-08-10 RX ORDER — CYCLOBENZAPRINE HCL 10 MG
10 TABLET ORAL 3 TIMES DAILY PRN
Qty: 15 TABLET | Refills: 0 | Status: SHIPPED | OUTPATIENT
Start: 2025-08-10 | End: 2025-08-15

## 2025-08-10 ASSESSMENT — PAIN SCALES - GENERAL: PAINLEVEL_OUTOF10: 8

## 2025-08-10 ASSESSMENT — PAIN - FUNCTIONAL ASSESSMENT: PAIN_FUNCTIONAL_ASSESSMENT: 0-10

## 2025-08-10 ASSESSMENT — PAIN DESCRIPTION - DESCRIPTORS: DESCRIPTORS: ACHING;TIGHTNESS

## 2025-08-10 ASSESSMENT — PAIN DESCRIPTION - LOCATION: LOCATION: NECK;BACK
